# Patient Record
Sex: MALE | Race: WHITE | NOT HISPANIC OR LATINO | Employment: OTHER | URBAN - METROPOLITAN AREA
[De-identification: names, ages, dates, MRNs, and addresses within clinical notes are randomized per-mention and may not be internally consistent; named-entity substitution may affect disease eponyms.]

---

## 2019-01-01 ENCOUNTER — OFFICE VISIT (OUTPATIENT)
Dept: URGENT CARE | Facility: CLINIC | Age: 69
End: 2019-01-01
Payer: MEDICARE

## 2019-01-01 VITALS
HEIGHT: 71 IN | DIASTOLIC BLOOD PRESSURE: 78 MMHG | WEIGHT: 212 LBS | RESPIRATION RATE: 20 BRPM | BODY MASS INDEX: 29.68 KG/M2 | TEMPERATURE: 98.9 F | HEART RATE: 88 BPM | OXYGEN SATURATION: 95 % | SYSTOLIC BLOOD PRESSURE: 120 MMHG

## 2019-01-01 DIAGNOSIS — J01.00 ACUTE MAXILLARY SINUSITIS, RECURRENCE NOT SPECIFIED: ICD-10-CM

## 2019-01-01 DIAGNOSIS — R22.0 FACIAL SWELLING: ICD-10-CM

## 2019-01-01 PROCEDURE — 99204 OFFICE O/P NEW MOD 45 MIN: CPT | Performed by: PHYSICIAN ASSISTANT

## 2019-01-01 RX ORDER — DOXYCYCLINE HYCLATE 100 MG
100 TABLET ORAL 2 TIMES DAILY
Qty: 14 TAB | Refills: 0 | Status: SHIPPED | OUTPATIENT
Start: 2019-01-01 | End: 2019-01-08

## 2019-01-01 RX ORDER — METHYLPREDNISOLONE 4 MG/1
TABLET ORAL
Qty: 21 TAB | Refills: 0 | Status: SHIPPED | OUTPATIENT
Start: 2019-01-01 | End: 2019-07-22

## 2019-01-01 ASSESSMENT — ENCOUNTER SYMPTOMS
FOCAL WEAKNESS: 0
MYALGIAS: 0
TINGLING: 0
SINUS PAIN: 1
WHEEZING: 0
CHILLS: 0
DIARRHEA: 0
SWOLLEN GLANDS: 0
VOMITING: 0
COUGH: 0
SINUS PRESSURE: 1
SORE THROAT: 0
NAUSEA: 0
FEVER: 0
NECK PAIN: 0
SPUTUM PRODUCTION: 0
SENSORY CHANGE: 0
SHORTNESS OF BREATH: 0
HEADACHES: 0
ABDOMINAL PAIN: 0
PALPITATIONS: 0

## 2019-01-01 NOTE — PROGRESS NOTES
Subjective:      Gosia Tejada is a 68 y.o. male who presents with Sinus Problem (Few days sinus pressure , earaches, stuffy nose , postnasal dripping .)            Sinus Problem   This is a new problem. Episode onset: 2-3 days  The problem has been rapidly worsening (rapidly worsening over the past day ) since onset. There has been no fever. Associated symptoms include congestion and sinus pressure. Pertinent negatives include no chills, coughing, ear pain, headaches, neck pain, shortness of breath, sneezing, sore throat or swollen glands. (Facial swelling ) Past treatments include nothing.     Patient denies any new exposure to soaps, lotions, cosmetics.  He has redness and swelling along his face. He denies shortness of breath, throat swelling, or difficulty swallowing.,      Past Medical History:   Diagnosis Date   • Arthritis    • Cholesterol blood decreased    • Heart burn    • Hyperlipemia    • Hypertension    • Pain    • Seasonal allergies        Past Surgical History:   Procedure Laterality Date   • HIP ARTHROPLASTY TOTAL  11/20/2014    Performed by Lucien Fierro M.D. at SURGERY [facility]   • JOINT INJECTION DIAGNOSTIC  2/20/2014    Performed by Lucien Fierro M.D. at SURGERY [facility]   • JOINT INJECTION DIAGNOSTIC  9/12/2013    Performed by Lucien Fierro M.D. at SURGERY [facility]   • OTHER  1954    TONSILLECTOMY   • OTHER      VASECTOMY       No family history on file.    Allergies   Allergen Reactions   • Nkda [No Known Drug Allergy]        Medications, Allergies, and current problem list reviewed today in Epic      Review of Systems   Constitutional: Negative for chills, fever and malaise/fatigue.   HENT: Positive for congestion, sinus pain and sinus pressure. Negative for ear pain, sneezing and sore throat.         Facial swelling    Respiratory: Negative for cough, sputum production, shortness of breath and wheezing.    Cardiovascular: Negative for chest pain,  "palpitations and leg swelling.   Gastrointestinal: Negative for abdominal pain, diarrhea, nausea and vomiting.   Musculoskeletal: Negative for myalgias and neck pain.   Skin: Negative for rash.   Neurological: Negative for tingling, sensory change, focal weakness and headaches.     All other systems reviewed and are negative.        Objective:     /78 (BP Location: Left arm, Patient Position: Sitting, BP Cuff Size: Large adult)   Pulse 88   Temp 37.2 °C (98.9 °F) (Temporal)   Resp 20   Ht 1.803 m (5' 11\")   Wt 96.2 kg (212 lb)   SpO2 95%   BMI 29.57 kg/m²      Physical Exam   Constitutional: He is oriented to person, place, and time. He appears well-developed and well-nourished. No distress.   HENT:   Head: Normocephalic and atraumatic.       Right Ear: Tympanic membrane, external ear and ear canal normal.   Left Ear: Tympanic membrane, external ear and ear canal normal.   Nose: Mucosal edema and rhinorrhea present. Right sinus exhibits maxillary sinus tenderness. Left sinus exhibits maxillary sinus tenderness.   Mouth/Throat: Uvula is midline, oropharynx is clear and moist and mucous membranes are normal. No posterior oropharyngeal edema or posterior oropharyngeal erythema. No tonsillar exudate.   Cardiovascular: Normal rate, regular rhythm and normal heart sounds.  Exam reveals no gallop and no friction rub.    No murmur heard.  Pulmonary/Chest: Effort normal and breath sounds normal. No respiratory distress. He has no wheezes. He has no rales.   Neurological: He is alert and oriented to person, place, and time. No cranial nerve deficit.   Skin: Skin is warm and dry. No rash noted.   Psychiatric: He has a normal mood and affect. His behavior is normal. Judgment and thought content normal.               Assessment/Plan:     1. Acute maxillary sinusitis, recurrence not specified    2. Facial swelling    Will treat for Sinusitis and possible facial cellulitis/inflammation(allergy-related). Patient " demonstrates no signs of anaphylaxis.   Patient is afebrile.     - doxycycline (VIBRAMYCIN) 100 MG Tab; Take 1 Tab by mouth 2 times a day for 7 days.  Dispense: 14 Tab; Refill: 0  - MethylPREDNISolone (MEDROL DOSEPAK) 4 MG Tablet Therapy Pack; Take as directed on package. 1 pack.  Dispense: 21 Tab; Refill: 0    ER if any worsening symptoms.     Differential diagnoses, Supportive care, and indications for immediate follow-up discussed with patient.   Instructed to return to clinic or nearest emergency department for any change in condition, further concerns, or worsening of symptoms.    The patient demonstrated a good understanding and agreed with the treatment plan.    Bri Nagel P.A.-C.

## 2019-06-20 ENCOUNTER — TELEPHONE (OUTPATIENT)
Dept: SCHEDULING | Facility: IMAGING CENTER | Age: 69
End: 2019-06-20

## 2019-07-22 ENCOUNTER — OFFICE VISIT (OUTPATIENT)
Dept: MEDICAL GROUP | Facility: PHYSICIAN GROUP | Age: 69
End: 2019-07-22
Payer: MEDICARE

## 2019-07-22 ENCOUNTER — HOSPITAL ENCOUNTER (OUTPATIENT)
Dept: LAB | Facility: MEDICAL CENTER | Age: 69
End: 2019-07-22
Attending: NURSE PRACTITIONER
Payer: MEDICARE

## 2019-07-22 VITALS
RESPIRATION RATE: 20 BRPM | BODY MASS INDEX: 29.93 KG/M2 | DIASTOLIC BLOOD PRESSURE: 88 MMHG | WEIGHT: 213.8 LBS | SYSTOLIC BLOOD PRESSURE: 142 MMHG | TEMPERATURE: 97.3 F | HEART RATE: 74 BPM | OXYGEN SATURATION: 97 % | HEIGHT: 71 IN

## 2019-07-22 DIAGNOSIS — E78.2 MIXED HYPERLIPIDEMIA: ICD-10-CM

## 2019-07-22 DIAGNOSIS — I10 ESSENTIAL HYPERTENSION: ICD-10-CM

## 2019-07-22 DIAGNOSIS — L71.9 ROSACEA, ACNE: ICD-10-CM

## 2019-07-22 DIAGNOSIS — Z12.5 SCREENING FOR PROSTATE CANCER: ICD-10-CM

## 2019-07-22 DIAGNOSIS — B00.9 HERPES: ICD-10-CM

## 2019-07-22 PROCEDURE — 99214 OFFICE O/P EST MOD 30 MIN: CPT | Performed by: NURSE PRACTITIONER

## 2019-07-22 PROCEDURE — 84153 ASSAY OF PSA TOTAL: CPT | Mod: GA

## 2019-07-22 PROCEDURE — 80061 LIPID PANEL: CPT

## 2019-07-22 PROCEDURE — 36415 COLL VENOUS BLD VENIPUNCTURE: CPT

## 2019-07-22 PROCEDURE — 83036 HEMOGLOBIN GLYCOSYLATED A1C: CPT | Mod: GA

## 2019-07-22 PROCEDURE — 80053 COMPREHEN METABOLIC PANEL: CPT

## 2019-07-22 RX ORDER — ACYCLOVIR 50 MG/G
1 OINTMENT TOPICAL
Qty: 1 TUBE | Refills: 3 | Status: SHIPPED | OUTPATIENT
Start: 2019-07-22 | End: 2022-10-11

## 2019-07-22 RX ORDER — VALACYCLOVIR HYDROCHLORIDE 1 G/1
1000 TABLET, FILM COATED ORAL 2 TIMES DAILY
Qty: 90 TAB | Refills: 0 | Status: SHIPPED | OUTPATIENT
Start: 2019-07-22 | End: 2021-08-10 | Stop reason: SDUPTHER

## 2019-07-22 RX ORDER — METRONIDAZOLE 7.5 MG/G
1 GEL TOPICAL DAILY
Qty: 1 TUBE | Refills: 3 | Status: SHIPPED | OUTPATIENT
Start: 2019-07-22 | End: 2020-11-17 | Stop reason: SDUPTHER

## 2019-07-22 RX ORDER — LISINOPRIL 10 MG/1
10 TABLET ORAL DAILY
Qty: 90 TAB | Refills: 0 | Status: SHIPPED | OUTPATIENT
Start: 2019-07-22 | End: 2019-11-08 | Stop reason: SDUPTHER

## 2019-07-22 RX ORDER — ACYCLOVIR 50 MG/G
1 OINTMENT TOPICAL
COMMUNITY
End: 2019-07-22 | Stop reason: SDUPTHER

## 2019-07-22 RX ORDER — METRONIDAZOLE 7.5 MG/G
1 GEL TOPICAL DAILY
COMMUNITY
End: 2019-07-22 | Stop reason: SDUPTHER

## 2019-07-22 RX ORDER — ATORVASTATIN CALCIUM 10 MG/1
10 TABLET, FILM COATED ORAL DAILY
Qty: 90 TAB | Refills: 1 | Status: SHIPPED | OUTPATIENT
Start: 2019-07-22 | End: 2020-02-22 | Stop reason: SDUPTHER

## 2019-07-22 RX ORDER — VALACYCLOVIR HYDROCHLORIDE 1 G/1
1000 TABLET, FILM COATED ORAL 2 TIMES DAILY
COMMUNITY
End: 2019-07-22 | Stop reason: SDUPTHER

## 2019-07-22 ASSESSMENT — ENCOUNTER SYMPTOMS
HEADACHES: 0
DIZZINESS: 0
ABDOMINAL PAIN: 0
CHILLS: 0
PALPITATIONS: 0
BLURRED VISION: 0
SHORTNESS OF BREATH: 0
FEVER: 0

## 2019-07-22 ASSESSMENT — PATIENT HEALTH QUESTIONNAIRE - PHQ9: CLINICAL INTERPRETATION OF PHQ2 SCORE: 0

## 2019-07-22 NOTE — ASSESSMENT & PLAN NOTE
Chronic in nature. Onset began 19 years ago. He is taking atorvastatin 10mg daily. He denies myalgias.

## 2019-07-22 NOTE — ASSESSMENT & PLAN NOTE
This is a chronic issue. Onset began 19 years ago. He denies CP, SOB, peripheral edema, visual difficulties, or urinary difficulties. He has been taking lisinopril 10mg daily. He admits to limited daily water intake and physical activity due to hip arthritis. He consumes 1-2 sodas daily. He is a nonsmoker. He does eat out at least 6 times per week.

## 2019-07-22 NOTE — PROGRESS NOTES
Gosia Tejada is a 69 y.o. male here to establish care. His previous PCP was Dr. Solano with [place]. He presents with the following concerns:    HPI:      Essential hypertension  This is a chronic issue. Onset began 19 years ago. He denies CP, SOB, peripheral edema, visual difficulties, or urinary difficulties. He has been taking lisinopril 10mg daily. He admits to limited daily water intake and physical activity due to hip arthritis. He consumes 1-2 sodas daily. He is a nonsmoker. He does eat out at least 6 times per week.    Mixed hyperlipidemia  Chronic in nature. Onset began 19 years ago. He is taking atorvastatin 10mg daily. He denies myalgias.    Herpes  Chronic in nature. Frequency of outbreaks every few months. Triggered by heat. Symptoms are managed with Zovirax ointment and valtrex PO with onset of outbreak.    Rosacea, acne  Chronic in nature. His symptoms are managed with Metrogel daily.       Current medicines (including changes today)  Current Outpatient Prescriptions   Medication Sig Dispense Refill   • lisinopril (PRINIVIL) 10 MG Tab Take 1 Tab by mouth every day. 90 Tab 0   • atorvastatin (LIPITOR) 10 MG Tab Take 1 Tab by mouth every day. 90 Tab 1   • valacyclovir (VALTREX) 1 GM Tab Take 1 Tab by mouth 2 times a day. 90 Tab 0   • acyclovir (ZOVIRAX) 5 % Ointment Apply 1 Application to affected area(s) every 3 hours as needed. 1 Tube 3   • metronidazole (METROGEL) 0.75 % gel Apply 1 Application to affected area(s) every day. 1 Tube 3     No current facility-administered medications for this visit.      He  has a past medical history of Arthritis; Heart burn; Hyperlipemia; Hypertension; Rosacea; and Seasonal allergies.  He  has a past surgical history that includes other (1954); other; joint injection diagnostic (9/12/2013); joint injection diagnostic (2/20/2014); and hip arthroplasty total (11/20/2014).  Social History   Substance Use Topics   • Smoking status: Never Smoker   •  "Smokeless tobacco: Never Used   • Alcohol use 1.2 oz/week     2 Cans of beer per week     Social History     Social History Narrative   • No narrative on file     Family History   Problem Relation Age of Onset   • Adopted: Yes     No family status information on file.     Review of Systems   Constitutional: Negative for chills, fever and malaise/fatigue.   HENT: Negative for hearing loss.    Eyes: Negative for blurred vision.   Respiratory: Negative for shortness of breath.    Cardiovascular: Negative for chest pain and palpitations.   Gastrointestinal: Negative for abdominal pain.   Musculoskeletal: Positive for joint pain (R hip).   Neurological: Negative for dizziness and headaches.     All other systems reviewed and are negative    Depression Screening    Little interest or pleasure in doing things?  0 - not at all  Feeling down, depressed , or hopeless? 0 - not at all  Patient Health Questionnaire Score: 0    If depressive symptoms identified deferred to follow up visit unless specifically addressed in assesment and plan.      Interpretation of PHQ-9 Total Score   Score Severity   1-4 Minimal Depression   5-9 Mild Depression   10-14 Moderate Depression   15-19 Moderately Severe Depression   20-27 Severe Depression       Objective:     /88   Pulse 74   Temp 36.3 °C (97.3 °F) (Temporal)   Resp 20   Ht 1.803 m (5' 11\")   Wt 97 kg (213 lb 12.8 oz)   SpO2 97%  Body mass index is 29.82 kg/m².    Physical Exam:  Constitutional: Oriented to person, place, and time and well-developed, well-nourished, and in no distress.   HENT:   Head: Normocephalic and atraumatic.   Right Ear: Tympanic membrane and external ear normal.   Left Ear: Tympanic membrane and external ear normal.   Mouth/Throat: Oropharynx is clear and moist and mucous membranes are normal. No oropharyngeal exudate or posterior oropharyngeal erythema.   Eyes: Conjunctivae and EOM are normal. Pupils are equal, round, and reactive to light.   Neck: " Normal range of motion. Neck supple. No thyromegaly present.   Cardiovascular: Normal rate, regular rhythm, normal heart sounds. Radial pulses intact. Exam reveals no friction rub. No murmur heard.  Pulmonary/Chest: Effort normal and breath sounds normal. No respiratory distress or use of accessory muscles. No wheezes, rhonchi, or rales.   Abdominal: Soft. Bowel sounds are normal. Exhibits no distension and no mass. There is no tenderness. No hepatosplenomegaly.    Musculoskeletal: Full range of motion. No deformity or swelling of joints. DTRs intact.   Neurological: Alert and oriented to person, place, and time. Gait normal.   Skin: Skin is warm and dry. No cyanosis. No edema.  Psychiatric: Mood, memory, affect and judgment normal.     Assessment and Plan:   The following treatment plan was discussed    1. Essential hypertension  His blood pressure is elevated at today's visit.  Continue lisinopril as prescribed.  Encourage lifestyle modifications with diet and regular physical activity.  Advised to monitor blood pressure at home and keep log; demonstrated proper technique for obtaining blood pressure reading.   - Comp Metabolic Panel; Future  - lisinopril (PRINIVIL) 10 MG Tab; Take 1 Tab by mouth every day.  Dispense: 90 Tab; Refill: 0    2. Mixed hyperlipidemia  Labs ordered to reevaluate cholesterol levels.  Continue statin therapy as prescribed.  - Comp Metabolic Panel; Future  - HEMOGLOBIN A1C; Future  - Lipid Profile; Future  - atorvastatin (LIPITOR) 10 MG Tab; Take 1 Tab by mouth every day.  Dispense: 90 Tab; Refill: 1    3. Herpes  Stable, controlled.  Refill medications.  - valacyclovir (VALTREX) 1 GM Tab; Take 1 Tab by mouth 2 times a day.  Dispense: 90 Tab; Refill: 0  - acyclovir (ZOVIRAX) 5 % Ointment; Apply 1 Application to affected area(s) every 3 hours as needed.  Dispense: 1 Tube; Refill: 3    4. Rosacea, acne  Stable, controlled.  Refill medication.  - metronidazole (METROGEL) 0.75 % gel; Apply 1  Application to affected area(s) every day.  Dispense: 1 Tube; Refill: 3    5. Screening for prostate cancer  - PROSTATE SPECIFIC AG SCREENING; Future    Records requested.    Followup: Return in about 1 month (around 8/22/2019) for For follow-up on, HTN.

## 2019-07-22 NOTE — LETTER
[facility]  ZULEYMA Cash  [address]  [place] [address]  Fax: [phone]   Authorization for Release/Disclosure of   Protected Health Information   Name: RAMON STACK : 1950 SSN: [id number]   Address: [address] [place] [id number] Phone:    [phone] (home) [phone] (work)   I authorize the entity listed below to release/disclose the PHI below to:   [facility]/ZULEYMA Cash and ZULEYMA Cash   Provider or Entity Name:  [facility]   [place], [facility] Phone:      Fax:  [phone]   Reason for request: continuity of care   Information to be released:    [  ] LAST COLONOSCOPY,  including any PATH REPORT and follow-up  [  ] LAST FIT/COLOGUARD RESULT [  ] LAST DEXA  [  ] LAST MAMMOGRAM  [  ] LAST PAP  [  ] LAST LABS [  ] RETINA EXAM REPORT  [  ] IMMUNIZATION RECORDS  [X  ] Release all info      [  ] Check here and initial the line next to each item to release ALL health information INCLUDING  _____ Care and treatment for drug and / or alcohol abuse  _____ HIV testing, infection status, or AIDS  _____ Genetic Testing    DATES OF SERVICE OR TIME PERIOD TO BE DISCLOSED: _____________  I understand and acknowledge that:  * This Authorization may be revoked at any time by you in writing, except if your health information has already been used or disclosed.  * Your health information that will be used or disclosed as a result of you signing this authorization could be re-disclosed by the recipient. If this occurs, your re-disclosed health information may no longer be protected by State or Federal laws.  * You may refuse to sign this Authorization. Your refusal will not affect your ability to obtain treatment.  * This Authorization becomes effective upon signing and will  on (date) __________.      If no date is indicated, this Authorization will  one (1) year from the signature date.    Name:  Gosia Tejada    Signature:   Date:     7/22/2019       PLEASE FAX REQUESTED RECORDS BACK TO: [phone]

## 2019-07-22 NOTE — ASSESSMENT & PLAN NOTE
Chronic in nature. Frequency of outbreaks every few months. Triggered by heat. Symptoms are managed with Zovirax ointment and valtrex PO with onset of outbreak.

## 2019-07-23 LAB
ALBUMIN SERPL BCP-MCNC: 4.3 G/DL (ref 3.2–4.9)
ALBUMIN/GLOB SERPL: 1.5 G/DL
ALP SERPL-CCNC: 35 U/L (ref 30–99)
ALT SERPL-CCNC: 21 U/L (ref 2–50)
ANION GAP SERPL CALC-SCNC: 7 MMOL/L (ref 0–11.9)
AST SERPL-CCNC: 23 U/L (ref 12–45)
BILIRUB SERPL-MCNC: 0.8 MG/DL (ref 0.1–1.5)
BUN SERPL-MCNC: 13 MG/DL (ref 8–22)
CALCIUM SERPL-MCNC: 9.1 MG/DL (ref 8.5–10.5)
CHLORIDE SERPL-SCNC: 107 MMOL/L (ref 96–112)
CHOLEST SERPL-MCNC: 176 MG/DL (ref 100–199)
CO2 SERPL-SCNC: 28 MMOL/L (ref 20–33)
CREAT SERPL-MCNC: 1.09 MG/DL (ref 0.5–1.4)
EST. AVERAGE GLUCOSE BLD GHB EST-MCNC: 126 MG/DL
GLOBULIN SER CALC-MCNC: 2.9 G/DL (ref 1.9–3.5)
GLUCOSE SERPL-MCNC: 93 MG/DL (ref 65–99)
HBA1C MFR BLD: 6 % (ref 0–5.6)
HDLC SERPL-MCNC: 42 MG/DL
LDLC SERPL CALC-MCNC: 98 MG/DL
POTASSIUM SERPL-SCNC: 4.3 MMOL/L (ref 3.6–5.5)
PROT SERPL-MCNC: 7.2 G/DL (ref 6–8.2)
PSA SERPL-MCNC: 0.6 NG/ML (ref 0–4)
SODIUM SERPL-SCNC: 142 MMOL/L (ref 135–145)
TRIGL SERPL-MCNC: 178 MG/DL (ref 0–149)

## 2019-07-24 ENCOUNTER — TELEPHONE (OUTPATIENT)
Dept: MEDICAL GROUP | Facility: PHYSICIAN GROUP | Age: 69
End: 2019-07-24

## 2019-07-24 NOTE — TELEPHONE ENCOUNTER
----- Message from ZULEYMA Cash sent at 7/24/2019  9:56 AM PDT -----  I will discuss results at upcoming appointment.    ZULEYMA Cash

## 2019-07-30 ENCOUNTER — OFFICE VISIT (OUTPATIENT)
Dept: MEDICAL GROUP | Facility: PHYSICIAN GROUP | Age: 69
End: 2019-07-30
Payer: MEDICARE

## 2019-07-30 VITALS
WEIGHT: 214 LBS | BODY MASS INDEX: 29.96 KG/M2 | HEIGHT: 71 IN | TEMPERATURE: 97.1 F | HEART RATE: 70 BPM | DIASTOLIC BLOOD PRESSURE: 70 MMHG | SYSTOLIC BLOOD PRESSURE: 126 MMHG | OXYGEN SATURATION: 93 % | RESPIRATION RATE: 18 BRPM

## 2019-07-30 DIAGNOSIS — E78.2 MIXED HYPERLIPIDEMIA: ICD-10-CM

## 2019-07-30 DIAGNOSIS — Z12.11 SCREENING FOR COLORECTAL CANCER: ICD-10-CM

## 2019-07-30 DIAGNOSIS — Z12.12 SCREENING FOR COLORECTAL CANCER: ICD-10-CM

## 2019-07-30 DIAGNOSIS — I10 ESSENTIAL HYPERTENSION: ICD-10-CM

## 2019-07-30 DIAGNOSIS — Z00.00 MEDICARE ANNUAL WELLNESS VISIT, SUBSEQUENT: ICD-10-CM

## 2019-07-30 DIAGNOSIS — M16.12 OSTEOARTHRITIS OF LEFT HIP, UNSPECIFIED OSTEOARTHRITIS TYPE: ICD-10-CM

## 2019-07-30 DIAGNOSIS — L71.9 ROSACEA, ACNE: ICD-10-CM

## 2019-07-30 DIAGNOSIS — B00.9 HERPES: ICD-10-CM

## 2019-07-30 DIAGNOSIS — R73.03 PREDIABETES: ICD-10-CM

## 2019-07-30 PROBLEM — M19.90 OA (OSTEOARTHRITIS): Status: ACTIVE | Noted: 2019-07-30

## 2019-07-30 PROCEDURE — G0439 PPPS, SUBSEQ VISIT: HCPCS | Performed by: NURSE PRACTITIONER

## 2019-07-30 ASSESSMENT — ENCOUNTER SYMPTOMS: GENERAL WELL-BEING: GOOD

## 2019-07-30 ASSESSMENT — PATIENT HEALTH QUESTIONNAIRE - PHQ9: CLINICAL INTERPRETATION OF PHQ2 SCORE: 0

## 2019-07-30 ASSESSMENT — ACTIVITIES OF DAILY LIVING (ADL): BATHING_REQUIRES_ASSISTANCE: 0

## 2019-07-30 NOTE — PROGRESS NOTES
Chief Complaint   Patient presents with   • Annual Wellness Visit         HPI:  Gosia is a 69 y.o. here for Medicare Annual Wellness Visit    Patient Active Problem List    Diagnosis Date Noted   • Prediabetes 07/30/2019   • Essential hypertension 07/22/2019   • Mixed hyperlipidemia 07/22/2019   • Herpes 07/22/2019   • Rosacea, acne 07/22/2019   • Osteoarthrosis, unspecified whether generalized or localized, pelvic region and thigh 09/12/2013       Current Outpatient Prescriptions   Medication Sig Dispense Refill   • lisinopril (PRINIVIL) 10 MG Tab Take 1 Tab by mouth every day. 90 Tab 0   • atorvastatin (LIPITOR) 10 MG Tab Take 1 Tab by mouth every day. 90 Tab 1   • valacyclovir (VALTREX) 1 GM Tab Take 1 Tab by mouth 2 times a day. 90 Tab 0   • acyclovir (ZOVIRAX) 5 % Ointment Apply 1 Application to affected area(s) every 3 hours as needed. 1 Tube 3   • metronidazole (METROGEL) 0.75 % gel Apply 1 Application to affected area(s) every day. 1 Tube 3     No current facility-administered medications for this visit.         Patient is taking medications as noted in medication list.  Current supplements as per medication list.     Allergies: Nkda [no known drug allergy]    Current social contact/activities:  Motorcycle riding    Is patient current with immunizations? No, due for TDAP. Patient is interested in receiving NONE today.    He  reports that he has never smoked. He has never used smokeless tobacco. He reports that he drinks about 1.2 oz of alcohol per week . He reports that he does not use drugs.  Counseling given: Not Answered        DPA/Advanced directive: Patient has Advanced Directive on file.     ROS:    Gait: Uses no assistive device   Ostomy: No   Other tubes: No   Amputations: No   Chronic oxygen use No   Last eye exam 06/2019  Wears hearing aids: No  : Denies any urinary leakage during the last 6 months      Screening:      Depression Screening    Little interest or pleasure in doing things?  0 - not  at all  Feeling down, depressed, or hopeless? 0 - not at all  Trouble falling or staying asleep, or sleeping too much?     Feeling tired or having little energy?     Poor appetite or overeating?     Feeling bad about yourself - or that you are a failure or have let yourself or your family down?    Trouble concentrating on things, such as reading the newspaper or watching television?    Moving or speaking so slowly that other people could have noticed.  Or the opposite - being so fidgety or restless that you have been moving around a lot more than usual?     Thoughts that you would be better off dead, or of hurting yourself?     Patient Health Questionnaire Score:        If depressive symptoms identified deferred to follow up visit unless specifically addressed in assessment and plan.    Interpretation of PHQ-9 Total Score   Score Severity   1-4 No Depression   5-9 Mild Depression   10-14 Moderate Depression   15-19 Moderately Severe Depression   20-27 Severe Depression      Screening for Cognitive Impairment    Three Minute Recall (village, kitchen, baby)  3/3    Draw clock face with all 12 numbers and set the hands to show 10 past 10.  Yes    If cognitive concerns identified, deferred for follow up unless specifically addressed in assessment and plan.    Fall Risk Assessment    Has the patient had two or more falls in the last year or any fall with injury in the last year?  No  If fall risk identified, deferred for follow up unless specifically addressed in assessment and plan.      Safety Assessment    Throw rugs on floor.  Yes  Handrails on all stairs.  Yes  Good lighting in all hallways.  Yes  Difficulty hearing.  No  Patient counseled about all safety risks that were identified.    Functional Assessment ADLs    Are there any barriers preventing you from cooking for yourself or meeting nutritional needs?  No.    Are there any barriers preventing you from driving safely or obtaining transportation?  No.    Are  "there any barriers preventing you from using a telephone or calling for help?  No.    Are there any barriers preventing you from shopping?  No.    Are there any barriers preventing you from taking care of your own finances?  No.    Are there any barriers preventing you from managing your medications?    No.    Are there any barriers preventing you from showering, bathing or dressing yourself?  No.    Are you currently engaging in any exercise or physical activity?  No.     What is your perception of your health?  Good.    Health Maintenance Summary                HEPATITIS C SCREENING Overdue 1950     IMM DTaP/Tdap/Td Vaccine Overdue 2/23/1968     COLONOSCOPY Overdue 2/23/1999     IMM ZOSTER VACCINES Overdue 2/23/1999     IMM INFLUENZA Next Due 9/1/2019      Done 10/26/2018 Imm Admin: Influenza Vaccine Adult HD     Patient has more history with this topic...          Patient Care Team:  ZULEYMA Cash as PCP - General (Family Medicine)      Social History   Substance Use Topics   • Smoking status: Never Smoker   • Smokeless tobacco: Never Used   • Alcohol use 1.2 oz/week     2 Cans of beer per week     Family History   Problem Relation Age of Onset   • Adopted: Yes     He  has a past medical history of Arthritis; Heart burn; Hyperlipemia; Hypertension; Rosacea; and Seasonal allergies.   Past Surgical History:   Procedure Laterality Date   • HIP ARTHROPLASTY TOTAL  11/20/2014    Left; Performed by Lucien Fierro M.D. at SURGERY [facility]   • JOINT INJECTION DIAGNOSTIC  2/20/2014    Performed by Lucien Fierro M.D. at SUR[facility]ERY [facility]   • JOINT INJECTION DIAGNOSTIC  9/12/2013    Performed by Lucien Fierro M.D. at SUR[facility]ERY [facility]   • OTHER  1954    TONSILLECTOMY   • OTHER      VASECTOMY         Exam:     /70   Pulse 70   Temp 36.2 °C (97.1 °F) (Temporal)   Resp 18   Ht 1.803 m (5' 11\")   Wt 97.1 kg (214 lb)   SpO2 93%  Body mass index is 29.85 kg/m².    Hearing " fair.    Dentition good  Alert, oriented in no acute distress.  Eye contact is good, speech goal directed, affect calm    Assessment and Plan. The following treatment and monitoring plan is recommended:    1. Medicare annual wellness visit, subsequent     2. Screening for colorectal cancer     3. Prediabetes     4. Mixed hyperlipidemia         Services suggested: No services needed at this time  Health Care Screening recommendations as per orders if indicated.  Referrals offered: PT/OT/Nutrition counseling/Behavioral Health/Smoking cessation as per orders if indicated.    Discussion today about general wellness and lifestyle habits:    · Prevent falls and reduce trip hazards; Cautioned about securing or removing rugs.  · Have a working fire alarm and carbon monoxide detector;   · Engage in regular physical activity and social activities       Follow-up: No Follow-up on file.

## 2019-07-30 NOTE — PROGRESS NOTES
Chief Complaint   Patient presents with   • Annual Wellness Visit       HPI:  Gosia is a 69 y.o. here for Medicare Annual Wellness Visit    Patient Active Problem List    Diagnosis Date Noted   • Prediabetes 07/30/2019   • OA (osteoarthritis) 07/30/2019   • Essential hypertension 07/22/2019   • Mixed hyperlipidemia 07/22/2019   • Herpes 07/22/2019   • Rosacea, acne 07/22/2019       Current Outpatient Prescriptions   Medication Sig Dispense Refill   • lisinopril (PRINIVIL) 10 MG Tab Take 1 Tab by mouth every day. 90 Tab 0   • atorvastatin (LIPITOR) 10 MG Tab Take 1 Tab by mouth every day. 90 Tab 1   • valacyclovir (VALTREX) 1 GM Tab Take 1 Tab by mouth 2 times a day. 90 Tab 0   • acyclovir (ZOVIRAX) 5 % Ointment Apply 1 Application to affected area(s) every 3 hours as needed. 1 Tube 3   • metronidazole (METROGEL) 0.75 % gel Apply 1 Application to affected area(s) every day. 1 Tube 3     No current facility-administered medications for this visit.         Patient is taking medications as noted in medication list.  Current supplements as per medication list.     Allergies: Nkda [no known drug allergy]    Current social contact/activities:  Motorcycle riding    Is patient current with immunizations? No, due for TDAP. Patient is interested in receiving NONE today.    He  reports that he has never smoked. He has never used smokeless tobacco. He reports that he drinks about 1.2 oz of alcohol per week . He reports that he does not use drugs.  Counseling given: Not Answered        DPA/Advanced directive: Patient has Advanced Directive on file.     ROS:    Gait: Uses no assistive device   Ostomy: No   Other tubes: No   Amputations: No   Chronic oxygen use No   Last eye exam 06/2019  Wears hearing aids: No  : Denies any urinary leakage during the last 6 months      Screening:      Depression Screening    Little interest or pleasure in doing things?  0 - not at all  Feeling down, depressed, or hopeless? 0 - not at all  Trouble  falling or staying asleep, or sleeping too much?     Feeling tired or having little energy?     Poor appetite or overeating?     Feeling bad about yourself - or that you are a failure or have let yourself or your family down?    Trouble concentrating on things, such as reading the newspaper or watching television?    Moving or speaking so slowly that other people could have noticed.  Or the opposite - being so fidgety or restless that you have been moving around a lot more than usual?     Thoughts that you would be better off dead, or of hurting yourself?     Patient Health Questionnaire Score:        If depressive symptoms identified deferred to follow up visit unless specifically addressed in assessment and plan.    Interpretation of PHQ-9 Total Score   Score Severity   1-4 No Depression   5-9 Mild Depression   10-14 Moderate Depression   15-19 Moderately Severe Depression   20-27 Severe Depression      Screening for Cognitive Impairment    Three Minute Recall (village, kitchen, baby)  3/3    Draw clock face with all 12 numbers and set the hands to show 10 past 10.  Yes    If cognitive concerns identified, deferred for follow up unless specifically addressed in assessment and plan.    Fall Risk Assessment    Has the patient had two or more falls in the last year or any fall with injury in the last year?  No  If fall risk identified, deferred for follow up unless specifically addressed in assessment and plan.      Safety Assessment    Throw rugs on floor.  Yes  Handrails on all stairs.  Yes  Good lighting in all hallways.  Yes  Difficulty hearing.  No  Patient counseled about all safety risks that were identified.    Functional Assessment ADLs    Are there any barriers preventing you from cooking for yourself or meeting nutritional needs?  No.    Are there any barriers preventing you from driving safely or obtaining transportation?  No.    Are there any barriers preventing you from using a telephone or calling for  "help?  No.    Are there any barriers preventing you from shopping?  No.    Are there any barriers preventing you from taking care of your own finances?  No.    Are there any barriers preventing you from managing your medications?    No.    Are there any barriers preventing you from showering, bathing or dressing yourself?  No.    Are you currently engaging in any exercise or physical activity?  No.     What is your perception of your health?  Good.    Health Maintenance Summary                HEPATITIS C SCREENING Overdue 1950     IMM DTaP/Tdap/Td Vaccine Overdue 2/23/1968     COLONOSCOPY Overdue 2/23/1999     IMM ZOSTER VACCINES Overdue 2/23/1999     IMM INFLUENZA Next Due 9/1/2019      Done 10/26/2018 Imm Admin: Influenza Vaccine Adult HD     Patient has more history with this topic...          Patient Care Team:  ZULEYMA Cash as PCP - General (Family Medicine)      Social History   Substance Use Topics   • Smoking status: Never Smoker   • Smokeless tobacco: Never Used   • Alcohol use 1.2 oz/week     2 Cans of beer per week     Family History   Problem Relation Age of Onset   • Adopted: Yes     He  has a past medical history of Arthritis; Heart burn; Hyperlipemia; Hypertension; Rosacea; and Seasonal allergies.   Past Surgical History:   Procedure Laterality Date   • HIP ARTHROPLASTY TOTAL  11/20/2014    Left; Performed by Lucien Fierro M.D. at SUR[facility]ERY [facility]   • JOINT INJECTION DIAGNOSTIC  2/20/2014    Performed by Lucien Fierro M.D. at SURGERY [facility]   • JOINT INJECTION DIAGNOSTIC  9/12/2013    Performed by Lucien Fierro M.D. at SURGERY [facility]   • OTHER  1954    TONSILLECTOMY   • OTHER      VASECTOMY       Exam:     /70   Pulse 70   Temp 36.2 °C (97.1 °F) (Temporal)   Resp 18   Ht 1.803 m (5' 11\")   Wt 97.1 kg (214 lb)   SpO2 93%  Body mass index is 29.85 kg/m².    Hearing fair.    Dentition good  Alert, oriented in no acute distress.  Eye contact is " good, speech goal directed, affect calm    Assessment and Plan. The following treatment and monitoring plan is recommended:    1. Medicare annual wellness visit, subsequent     2. Screening for colorectal cancer     3. Prediabetes  Recommend lifestyle modifications with diet and regular physical activity.   4. Mixed hyperlipidemia  Stable. Continue statin therapy as prescribed and continue working on lifestyle modifications.   5. Osteoarthritis of left hip, unspecified osteoarthritis type  Stable, controlled.   6. Rosacea, acne  Stable, controlled.   7. Essential hypertension  Stable, controlled.   8. Herpes  Stable, controlled.       Services suggested: No services needed at this time  Health Care Screening recommendations as per orders if indicated.  Referrals offered: PT/OT/Nutrition counseling/Behavioral Health/Smoking cessation as per orders if indicated.    Discussion today about general wellness and lifestyle habits:    · Prevent falls and reduce trip hazards; Cautioned about securing or removing rugs.  · Have a working fire alarm and carbon monoxide detector;   · Engage in regular physical activity and social activities     Follow-up: Return in about 1 month (around 8/30/2019), or if symptoms worsen or fail to improve, for HTN.

## 2019-08-20 ENCOUNTER — OFFICE VISIT (OUTPATIENT)
Dept: MEDICAL GROUP | Facility: PHYSICIAN GROUP | Age: 69
End: 2019-08-20
Payer: MEDICARE

## 2019-08-20 VITALS
SYSTOLIC BLOOD PRESSURE: 122 MMHG | RESPIRATION RATE: 18 BRPM | OXYGEN SATURATION: 93 % | DIASTOLIC BLOOD PRESSURE: 68 MMHG | WEIGHT: 212.2 LBS | TEMPERATURE: 97 F | BODY MASS INDEX: 29.71 KG/M2 | HEIGHT: 71 IN | HEART RATE: 65 BPM

## 2019-08-20 DIAGNOSIS — I10 ESSENTIAL HYPERTENSION: ICD-10-CM

## 2019-08-20 DIAGNOSIS — Z01.818 PREOPERATIVE CLEARANCE: ICD-10-CM

## 2019-08-20 PROCEDURE — 99214 OFFICE O/P EST MOD 30 MIN: CPT | Performed by: NURSE PRACTITIONER

## 2019-08-20 ASSESSMENT — ENCOUNTER SYMPTOMS
FEVER: 0
SHORTNESS OF BREATH: 0
CHILLS: 0
PALPITATIONS: 0
HEADACHES: 0
DIZZINESS: 0

## 2019-08-20 NOTE — PROGRESS NOTES
Subjective:   Gosia Tejada is a 69 y.o. male here today for follow up on HTN.    Preoperative clearance  He is planning to have R hip replacement with [facility] Dr Luis Dupont sometime in Nov. He states that his surgery will not be scheduled until he is medically cleared by his PCP.     Essential hypertension  This is a chronic issue. His BP is currently managed with lisinopril 10mg daily. His BP today is 122/68. He denies CP, SOB, dizziness, or headaches. He has increased daily water intake over the past month. His physical activity is limited due to hip arthritis.      Current medicines (including changes today)  Current Outpatient Medications   Medication Sig Dispense Refill   • lisinopril (PRINIVIL) 10 MG Tab Take 1 Tab by mouth every day. 90 Tab 0   • atorvastatin (LIPITOR) 10 MG Tab Take 1 Tab by mouth every day. 90 Tab 1   • valacyclovir (VALTREX) 1 GM Tab Take 1 Tab by mouth 2 times a day. 90 Tab 0   • acyclovir (ZOVIRAX) 5 % Ointment Apply 1 Application to affected area(s) every 3 hours as needed. 1 Tube 3   • metronidazole (METROGEL) 0.75 % gel Apply 1 Application to affected area(s) every day. 1 Tube 3     No current facility-administered medications for this visit.      He  has a past medical history of Arthritis, Heart burn, Hyperlipemia, Hypertension, Rosacea, and Seasonal allergies.    Social History     Socioeconomic History   • Marital status:      Spouse name: Not on file   • Number of children: Not on file   • Years of education: Not on file   • Highest education level: Not on file   Occupational History     Comment: Psychologist    Social Needs   • Financial resource strain: Not on file   • Food insecurity:     Worry: Not on file     Inability: Not on file   • Transportation needs:     Medical: Not on file     Non-medical: Not on file   Tobacco Use   • Smoking status: Never Smoker   • Smokeless tobacco: Never Used   Substance and Sexual Activity   • Alcohol use: Yes      "Alcohol/week: 1.2 oz     Types: 2 Cans of beer per week   • Drug use: No   • Sexual activity: Not on file     Comment: ; 31 years    Lifestyle   • Physical activity:     Days per week: Not on file     Minutes per session: Not on file   • Stress: Not on file   Relationships   • Social connections:     Talks on phone: Not on file     Gets together: Not on file     Attends [employer] service: Not on file     Active member of club or organization: Not on file     Attends meetings of clubs or organizations: Not on file     Relationship status: Not on file   • Intimate partner violence:     Fear of current or ex partner: Not on file     Emotionally abused: Not on file     Physically abused: Not on file     Forced sexual activity: Not on file   Other Topics Concern   • Not on file   Social History Narrative   • Not on file       Review of Systems   Constitutional: Negative for chills and fever.   Respiratory: Negative for shortness of breath.    Cardiovascular: Negative for chest pain, palpitations and leg swelling.   Musculoskeletal: Positive for joint pain.   Neurological: Negative for dizziness and headaches.        Objective:     /68   Pulse 65   Temp 36.1 °C (97 °F) (Temporal)   Resp 18   Ht 1.803 m (5' 11\")   Wt 96.3 kg (212 lb 3.2 oz)   SpO2 93%  Body mass index is 29.6 kg/m².     Physical Exam:  Constitutional: Oriented to person, place, and time and well-developed, well-nourished, and in no distress.   HENT:   Head: Normocephalic and atraumatic.   Eyes: Conjunctivae and EOM are normal. Pupils are equal, round, and reactive to light.   Neck: Normal range of motion. Neck supple.  Cardiovascular: Normal rate, regular rhythm, normal heart sounds. Radial pulses intact. Exam reveals no friction rub. No murmur heard.  Pulmonary/Chest: Effort normal and breath sounds normal. No respiratory distress or use of accessory muscles. No wheezes, rhonchi, or rales.   Abdominal: Soft. Bowel sounds are normal. " Exhibits no distension and no mass. There is no tenderness. No hepatosplenomegaly.    Musculoskeletal: Full range of motion. No deformity or swelling of joints.   Neurological: Alert and oriented to person, place, and time.    Skin: Skin is warm and dry. No cyanosis. No edema.  Psychiatric: Mood, memory, affect and judgment normal.       Assessment and Plan:   The following treatment plan was discussed    1. Essential hypertension  Stable, improved. Continue lisinopril as prescribed. Continue working on lifestyle modifications with diet and physical activity. Continue monitoring BP at home and keep log.  - DX-CHEST-2 VIEWS; Future    2. Preoperative clearance  Advised to have labs and chest xray drawn one week prior to scheduled preop clearance.  - DX-CHEST-2 VIEWS; Future  - CBC WITH DIFFERENTIAL; Future      Followup: Return in about 1 month (around 9/20/2019), or if symptoms worsen or fail to improve.

## 2019-08-20 NOTE — ASSESSMENT & PLAN NOTE
He is planning to have R hip replacement with [facility] Dr Luis Dupont sometime in Nov. He states that his surgery will not be scheduled until he is medically cleared by his PCP.

## 2019-08-20 NOTE — ASSESSMENT & PLAN NOTE
This is a chronic issue. His BP is currently managed with lisinopril 10mg daily. His BP today is 122/68. He denies CP, SOB, dizziness, or headaches. He has increased daily water intake over the past month. His physical activity is limited due to hip arthritis.

## 2019-08-27 ENCOUNTER — HOSPITAL ENCOUNTER (OUTPATIENT)
Dept: LAB | Facility: MEDICAL CENTER | Age: 69
End: 2019-08-27
Attending: NURSE PRACTITIONER
Payer: MEDICARE

## 2019-08-27 DIAGNOSIS — Z01.818 PREOPERATIVE CLEARANCE: ICD-10-CM

## 2019-08-27 LAB
BASOPHILS # BLD AUTO: 1.6 % (ref 0–1.8)
BASOPHILS # BLD: 0.06 K/UL (ref 0–0.12)
EOSINOPHIL # BLD AUTO: 0.11 K/UL (ref 0–0.51)
EOSINOPHIL NFR BLD: 2.9 % (ref 0–6.9)
ERYTHROCYTE [DISTWIDTH] IN BLOOD BY AUTOMATED COUNT: 43.4 FL (ref 35.9–50)
HCT VFR BLD AUTO: 45.8 % (ref 42–52)
HGB BLD-MCNC: 14.2 G/DL (ref 14–18)
IMM GRANULOCYTES # BLD AUTO: 0 K/UL (ref 0–0.11)
IMM GRANULOCYTES NFR BLD AUTO: 0 % (ref 0–0.9)
LYMPHOCYTES # BLD AUTO: 1.07 K/UL (ref 1–4.8)
LYMPHOCYTES NFR BLD: 28.2 % (ref 22–41)
MCH RBC QN AUTO: 27.8 PG (ref 27–33)
MCHC RBC AUTO-ENTMCNC: 31 G/DL (ref 33.7–35.3)
MCV RBC AUTO: 89.8 FL (ref 81.4–97.8)
MONOCYTES # BLD AUTO: 0.38 K/UL (ref 0–0.85)
MONOCYTES NFR BLD AUTO: 10 % (ref 0–13.4)
NEUTROPHILS # BLD AUTO: 2.17 K/UL (ref 1.82–7.42)
NEUTROPHILS NFR BLD: 57.3 % (ref 44–72)
NRBC # BLD AUTO: 0 K/UL
NRBC BLD-RTO: 0 /100 WBC
PLATELET # BLD AUTO: 224 K/UL (ref 164–446)
PMV BLD AUTO: 10.4 FL (ref 9–12.9)
RBC # BLD AUTO: 5.1 M/UL (ref 4.7–6.1)
WBC # BLD AUTO: 3.8 K/UL (ref 4.8–10.8)

## 2019-08-27 PROCEDURE — 36415 COLL VENOUS BLD VENIPUNCTURE: CPT

## 2019-08-27 PROCEDURE — 85025 COMPLETE CBC W/AUTO DIFF WBC: CPT

## 2019-09-04 ENCOUNTER — TELEPHONE (OUTPATIENT)
Dept: MEDICAL GROUP | Facility: PHYSICIAN GROUP | Age: 69
End: 2019-09-04

## 2019-09-04 ENCOUNTER — OFFICE VISIT (OUTPATIENT)
Dept: MEDICAL GROUP | Facility: PHYSICIAN GROUP | Age: 69
End: 2019-09-04
Payer: MEDICARE

## 2019-09-04 VITALS
DIASTOLIC BLOOD PRESSURE: 90 MMHG | HEIGHT: 71 IN | OXYGEN SATURATION: 96 % | HEART RATE: 84 BPM | RESPIRATION RATE: 18 BRPM | TEMPERATURE: 97.8 F | WEIGHT: 211.8 LBS | BODY MASS INDEX: 29.65 KG/M2 | SYSTOLIC BLOOD PRESSURE: 132 MMHG

## 2019-09-04 DIAGNOSIS — Z01.818 PREOPERATIVE CLEARANCE: ICD-10-CM

## 2019-09-04 PROCEDURE — 99214 OFFICE O/P EST MOD 30 MIN: CPT | Performed by: NURSE PRACTITIONER

## 2019-09-04 PROCEDURE — 93000 ELECTROCARDIOGRAM COMPLETE: CPT | Performed by: NURSE PRACTITIONER

## 2019-09-04 NOTE — LETTER
September 4, 2019         Patient: Gosia Tejada   YOB: 1950   Date of Visit: 9/4/2019           To Whom it May Concern:    I am Mr. Gosia Tejada's primary care provider. He is medically cleared to have anterior right total hip arthoplasty.     If you have any questions or concerns, please don't hesitate to call.        Sincerely,           BUNNY Cash.  Electronically Signed

## 2019-09-04 NOTE — PROGRESS NOTES
REASON FOR VISIT: Pre-Op Consultation  Consultation Requested by: Dr. [facility]  Procedure date and type: Pending surgery date; Anterior right total hip arthroplasty  History of condition for which surgery is planned: Chronic arthritic pain of R hip.    Current chronic conditions: has Essential hypertension; Mixed hyperlipidemia; Herpes; Rosacea, acne; Prediabetes; OA (osteoarthritis); and Preoperative clearance on their problem list.  Past medical history:  has a past medical history of Arthritis, Heart burn, Hyperlipemia, Hypertension, Rosacea, and Seasonal allergies.. Negative for: CAD, SBE, CVA, TIA, DVT, PE, bleeding requiring transfusion, intubation.  Surgical and anesthetic history:  has a past surgical history that includes other (1954); other; joint injection diagnostic (9/12/2013); joint injection diagnostic (2/20/2014); and hip arthroplasty total (11/20/2014). Prior surgery without complication, bleeding, reaction to anesthetic, prolonged recovery  Habits:   Social History     Tobacco Use   • Smoking status: Never Smoker   • Smokeless tobacco: Never Used   Substance Use Topics   • Alcohol use: Yes     Alcohol/week: 1.2 oz     Types: 2 Cans of beer per week   • Drug use: No     Allergies: Nkda [no known drug allergy] No known allergy to Anesthetic, or Latex.     Current medicines:   Current Outpatient Medications   Medication Sig Dispense Refill   • lisinopril (PRINIVIL) 10 MG Tab Take 1 Tab by mouth every day. 90 Tab 0   • atorvastatin (LIPITOR) 10 MG Tab Take 1 Tab by mouth every day. 90 Tab 1   • valacyclovir (VALTREX) 1 GM Tab Take 1 Tab by mouth 2 times a day. 90 Tab 0   • acyclovir (ZOVIRAX) 5 % Ointment Apply 1 Application to affected area(s) every 3 hours as needed. 1 Tube 3   • metronidazole (METROGEL) 0.75 % gel Apply 1 Application to affected area(s) every day. 1 Tube 3     No current facility-administered medications for this visit.      Anticoagulant: ASA, NSAIDs  "  Herbals: Black Cohash, Echinacea, Garlic, [place], Ginkgo Biloba, Ginseng, Kava, Ralph’s Wort,  Saw Palmetto, Valerian             ROS: negative for: CP, SOB, SHEEHAN, Orthopnea, wheezing, leg edema, polydipsia, polyuria, fevers, chills, sweats, cough, cold, congestion, abd pain, reflux, black or bloody stools, weight loss/gain.  Functional Status: ambulatory, uses assistive device, prolonged immobility, lives alone, requires PT/FT care    PHYSICAL EXAMINATION:  VITAL SIGNS: /90   Pulse 84   Temp 36.6 °C (97.8 °F) (Temporal)   Resp 18   Ht 1.803 m (5' 11\")   Wt 96.1 kg (211 lb 12.8 oz)   SpO2 96%  Body mass index is 29.54 kg/m².  HEENT: EOMI, PERRL. Oropharynx pink, moist. Mallampati: I (soft palate, uvula, fauces). Neck supple, no cervical lymphadenopathy.   LUNGS: CTAB good excursion.   HEART: RRR no murmur.  ABDOMEN: soft, nondistended, nontender normal BS. No HSM.  LOWER EXTREMITIES: warm and well perfused with no edema.    Labs: See attached.    EKG Interpretation   Interpreted by me   Rhythm: normal sinus   Rate: normal   Axis: normal   Ectopy: none   Conduction: normal   ST Segments: no acute change   T Waves: no acute change   Q Waves: none   Clinical Impression: no acute changes and normal EKG    IMPRESSION:  The encounter diagnosis was Preoperative clearance.  1. Planned surgery: Anterior right total hip arthroplasty  2. High risk medical conditions: negative for Cardiac, Pulmonary, Bleeding, Poor healing, Thrombosis, Debility    PLAN:  1. Chronic medical conditions: Stable and controlled. Continue current medicines.   2. Avoid drugs that potentiate bleeding as advised by surgeon  3. Discontinue all herbal supplements 2 weeks prior to surgery.  4. Need for SBE prophylaxis: No  5. This patient is considered Low risk for cardiopulmonary complications for this planned surgery.    Wicho Index for assessing perioperative cardiovascular risk [Circulation 1999;100:1047]:   (one point each for * high-risk " surgery [ intrathoracic, suprainguinal vascular, intraperitoneal ],* Hx ischemic heart dz, * Hx CHF, *Hx TIA or CVA, *IDDM, *Serum Cr >2.0)   Interpretation of risk: (Complication = MI, Pulm edema, v-fib or cardiac arrest, complete heart block)  Very Low: 0 points = 0.4 % complication. Low: 1 point = 0.9 %, Moderate: 2 points = 6.6 %, High: 3+ points = 11%.

## 2019-09-04 NOTE — TELEPHONE ENCOUNTER
----- Message from ZULEYMA Cash sent at 9/4/2019  9:13 AM PDT -----  Please fax letter, progress note, EKG, labs, and chest xray results to [facility] office.    ZULEYMA Cash

## 2019-09-17 ENCOUNTER — HOSPITAL ENCOUNTER (OUTPATIENT)
Facility: MEDICAL CENTER | Age: 69
End: 2019-09-17
Attending: ORTHOPAEDIC SURGERY | Admitting: ORTHOPAEDIC SURGERY
Payer: MEDICARE

## 2019-09-30 ENCOUNTER — IMMUNIZATION (OUTPATIENT)
Dept: SOCIAL WORK | Facility: CLINIC | Age: 69
End: 2019-09-30
Payer: MEDICARE

## 2019-09-30 DIAGNOSIS — Z23 NEED FOR VACCINATION: ICD-10-CM

## 2019-09-30 PROCEDURE — G0008 ADMIN INFLUENZA VIRUS VAC: HCPCS | Performed by: REGISTERED NURSE

## 2019-09-30 PROCEDURE — 90662 IIV NO PRSV INCREASED AG IM: CPT | Performed by: REGISTERED NURSE

## 2019-11-19 ENCOUNTER — APPOINTMENT (OUTPATIENT)
Dept: ADMISSIONS | Facility: MEDICAL CENTER | Age: 69
End: 2019-11-19
Payer: MEDICARE

## 2020-06-23 ENCOUNTER — OFFICE VISIT (OUTPATIENT)
Dept: MEDICAL GROUP | Facility: PHYSICIAN GROUP | Age: 70
End: 2020-06-23
Payer: MEDICARE

## 2020-06-23 VITALS
HEART RATE: 60 BPM | HEIGHT: 71 IN | DIASTOLIC BLOOD PRESSURE: 80 MMHG | OXYGEN SATURATION: 96 % | TEMPERATURE: 99.1 F | WEIGHT: 216 LBS | RESPIRATION RATE: 16 BRPM | BODY MASS INDEX: 30.24 KG/M2 | SYSTOLIC BLOOD PRESSURE: 124 MMHG

## 2020-06-23 DIAGNOSIS — R73.09 ELEVATED HEMOGLOBIN A1C: ICD-10-CM

## 2020-06-23 DIAGNOSIS — I10 ESSENTIAL HYPERTENSION: ICD-10-CM

## 2020-06-23 DIAGNOSIS — Z11.59 NEED FOR HEPATITIS C SCREENING TEST: ICD-10-CM

## 2020-06-23 DIAGNOSIS — Z23 NEED FOR VACCINATION: ICD-10-CM

## 2020-06-23 DIAGNOSIS — E78.2 MIXED HYPERLIPIDEMIA: ICD-10-CM

## 2020-06-23 PROCEDURE — 90471 IMMUNIZATION ADMIN: CPT | Performed by: NURSE PRACTITIONER

## 2020-06-23 PROCEDURE — 99214 OFFICE O/P EST MOD 30 MIN: CPT | Mod: 25 | Performed by: NURSE PRACTITIONER

## 2020-06-23 PROCEDURE — 90715 TDAP VACCINE 7 YRS/> IM: CPT | Performed by: NURSE PRACTITIONER

## 2020-06-23 RX ORDER — LISINOPRIL 10 MG/1
10 TABLET ORAL DAILY
Qty: 90 TAB | Refills: 0 | Status: SHIPPED | OUTPATIENT
Start: 2020-06-23 | End: 2020-09-30 | Stop reason: SDUPTHER

## 2020-06-23 RX ORDER — ATORVASTATIN CALCIUM 10 MG/1
10 TABLET, FILM COATED ORAL DAILY
Qty: 90 TAB | Refills: 0 | Status: SHIPPED | OUTPATIENT
Start: 2020-06-23 | End: 2020-09-30 | Stop reason: SDUPTHER

## 2020-06-23 SDOH — HEALTH STABILITY: MENTAL HEALTH: HOW OFTEN DO YOU HAVE A DRINK CONTAINING ALCOHOL?: 2-4 TIMES A MONTH

## 2020-06-23 ASSESSMENT — FIBROSIS 4 INDEX: FIB4 SCORE: 1.59

## 2020-06-23 ASSESSMENT — PATIENT HEALTH QUESTIONNAIRE - PHQ9: CLINICAL INTERPRETATION OF PHQ2 SCORE: 0

## 2020-06-23 NOTE — ASSESSMENT & PLAN NOTE
New to me.  This is a chronic issue.  Originally diagnosed 20 years ago.  Currently taking atorvastatin 10 mg, tolerating well.  He needs refills today.  He denies CP, palpitations, dizziness, headaches, lower extremity swelling.  Last lipid panel was last year with elevated triglycerides.  His last A1c last year was 6, his BP is well controlled.  No family history of lumbar stroke as patient was adopted.  
To me.  Chronic problem.  BP today is WNL.  Patient was originally diagnosed with HTN 20 years ago, taking lisinopril 10 mg daily.  He needs refills today.  He denies CP, palpitations, dizziness, headaches, peripheral edema.  
Statement Selected

## 2020-06-23 NOTE — PROGRESS NOTES
Chief Complaint   Patient presents with   • Establish Care       HISTORY OF PRESENT ILLNESS: Patient is a 70 y.o. male, established patient who presents today to discuss medical problems as listed below:    Health Maintenance:  COMPLETED.    Essential hypertension  To me.  Chronic problem.  BP today is WNL.  Patient was originally diagnosed with HTN 20 years ago, taking lisinopril 10 mg daily.  He needs refills today.  He denies CP, palpitations, dizziness, headaches, peripheral edema.    Mixed hyperlipidemia  New to me.  This is a chronic issue.  Originally diagnosed 20 years ago.  Currently taking atorvastatin 10 mg, tolerating well.  He needs refills today.  He denies CP, palpitations, dizziness, headaches, lower extremity swelling.  Last lipid panel was last year with elevated triglycerides.  His last A1c last year was 6, his BP is well controlled.  No family history of lumbar stroke as patient was adopted.      Patient Active Problem List    Diagnosis Date Noted   • Preoperative clearance 08/20/2019   • Prediabetes 07/30/2019   • OA (osteoarthritis) 07/30/2019   • Essential hypertension 07/22/2019   • Mixed hyperlipidemia 07/22/2019   • Herpes 07/22/2019   • Rosacea, acne 07/22/2019        Allergies: Nkda [no known drug allergy]    Current Outpatient Medications   Medication Sig Dispense Refill   • lisinopril (PRINIVIL) 10 MG Tab Take 1 Tab by mouth every day. 90 Tab 0   • atorvastatin (LIPITOR) 10 MG Tab Take 1 Tab by mouth every day. 90 Tab 0   • valacyclovir (VALTREX) 1 GM Tab Take 1 Tab by mouth 2 times a day. 90 Tab 0   • acyclovir (ZOVIRAX) 5 % Ointment Apply 1 Application to affected area(s) every 3 hours as needed. 1 Tube 3   • metronidazole (METROGEL) 0.75 % gel Apply 1 Application to affected area(s) every day. 1 Tube 3     No current facility-administered medications for this visit.        Social History     Tobacco Use   • Smoking status: Never Smoker   • Smokeless tobacco: Never Used   Substance  "Use Topics   • Alcohol use: Yes     Alcohol/week: 1.2 oz     Types: 2 Cans of beer per week     Frequency: 2-4 times a month   • Drug use: No     Social History     Social History Narrative   • Not on file       Family History   Adopted: Yes       Allergies, past medical history, past surgical history, family history, social history reviewed and updated.    Review of Systems:     - Constitutional: Negative for fever, chills, unexpected weight change, and fatigue/generalized weakness.     - HEENT: Negative for headaches, vision changes, hearing changes, ear pain, ear discharge, rhinorrhea, sinus congestion, sore throat, and neck pain.      - Respiratory: Negative for cough, sputum production, chest congestion, dyspnea, wheezing, and crackles.      - Cardiovascular: Negative for chest pain, palpitations, orthopnea, and bilateral lower extremity edema.     - Gastrointestinal: Negative for heartburn, nausea, vomiting, abdominal pain, hematochezia, melena, diarrhea, constipation, and greasy/foul-smelling stools.     - Genitourinary: Negative for dysuria, polyuria, hematuria, pyuria, urinary urgency, and urinary incontinence.    - Musculoskeletal: Negative for myalgias, back pain, and joint pain.     - Skin: Negative for rash, itching, cyanotic skin color change.     - Neurological: Negative for dizziness, tingling, tremors, focal sensory deficit, focal weakness and headaches.     - Endo/Heme/Allergies: Does not bruise/bleed easily.     - Psychiatric/Behavioral: Negative for depression, suicidal/homicidal ideation and memory loss.      All other systems reviewed and are negative    Exam:    /80 (BP Location: Left arm, Patient Position: Sitting, BP Cuff Size: Adult)   Pulse 60   Temp 37.3 °C (99.1 °F) (Temporal)   Resp 16   Ht 1.803 m (5' 11\")   Wt 98 kg (216 lb)   SpO2 96%   BMI 30.13 kg/m²  Body mass index is 30.13 kg/m².    Physical Exam:  Constitutional: Well-developed and well-nourished. Not diaphoretic. " No distress.   Skin: Skin is warm and dry. No rash noted.  Head: Atraumatic without lesions.  Eyes: Conjunctivae and extraocular motions are normal. Pupils are equal, round, and reactive to light. No scleral icterus.   Ears:  External ears unremarkable. Tympanic membranes clear and intact.  Nose: Nares patent. Septum midline. Turbinates without erythema nor edema. No discharge.   Mouth/Throat: Dentition is normal. Tongue normal. Oropharynx is clear and moist. Posterior pharynx without erythema or exudates.  Neck: Supple, trachea midline. Normal range of motion. No thyromegaly present. No lymphadenopathy--cervical or supraclavicular.  Cardiovascular: Regular rate and rhythm, S1 and S2 without murmur, rubs, or gallops.    Chest: Effort normal. Clear to auscultation throughout. No adventitious sounds. No CVA tenderness.  Abdomen: Soft, non tender, and without distention. Active bowel sounds in all four quadrants. No rebound, guarding, masses or HSM.  : Negative for dysuria, polyuria, hematuria, pyuria, urinary urgency, and urinary incontinence.  Extremities: No cyanosis, clubbing, erythema, nor edema. Distal pulses intact and symmetric.   Musculoskeletal: All major joints AROM full in all directions without pain.  Neurological: Alert and oriented x 3. DTRs 2+/3 and symmetric. No cranial nerve deficit. 5/5 myotomes. Sensation intact. Negative Rhomberg.  Psychiatric:  Behavior, mood, and affect are appropriate.  MA/nursing note and vitals reviewed.    LABS: 2019  results reviewed and discussed with the patient, questions answered.    Assessment/Plan:  1. Need for hepatitis C screening test  - HCV Scrn ( 5020-0310 1xLife); Future    2. Need for vaccination  - Tdap Vaccine =>6YO IM    3. Essential hypertension  Well-controlled.  Continue current medications.  - lisinopril (PRINIVIL) 10 MG Tab; Take 1 Tab by mouth every day.  Dispense: 90 Tab; Refill: 0  - Comp Metabolic Panel; Future  - CBC WITH DIFFERENTIAL;  Future  - TSH; Future  - Lipid Profile; Future  - HEMOGLOBIN A1C; Future    4. Mixed hyperlipidemia  Stable.  Continue current medications.  We will recheck labs and discuss findings with patient.  - atorvastatin (LIPITOR) 10 MG Tab; Take 1 Tab by mouth every day.  Dispense: 90 Tab; Refill: 0  - Comp Metabolic Panel; Future  - CBC WITH DIFFERENTIAL; Future  - TSH; Future  - Lipid Profile; Future  - HEMOGLOBIN A1C; Future    5. Elevated hemoglobin A1c  Stable.  Will recheck labs and discuss findings with patient.  - HEMOGLOBIN A1C; Future       Discussed with patient possible alternative diagnoses, pt is to take all medications as prescribed. If symptoms persist FU w/PCP, if symptoms worsen go to emergency room. If experiencing any side effects from prescribed medications reports to the office immediately or go to emergency room.  Reviewed indication, dosage, usage and potential adverse effects of prescribed medications. Reviewed risks and benefits of treatment plan. Patient verbalizes understanding of all instruction and verbally agrees to plan.    Return if symptoms worsen or fail to improve.

## 2020-07-06 ENCOUNTER — HOSPITAL ENCOUNTER (OUTPATIENT)
Dept: LAB | Facility: MEDICAL CENTER | Age: 70
End: 2020-07-06
Attending: NURSE PRACTITIONER
Payer: MEDICARE

## 2020-07-06 DIAGNOSIS — R73.09 ELEVATED HEMOGLOBIN A1C: ICD-10-CM

## 2020-07-06 DIAGNOSIS — I10 ESSENTIAL HYPERTENSION: ICD-10-CM

## 2020-07-06 DIAGNOSIS — E78.2 MIXED HYPERLIPIDEMIA: ICD-10-CM

## 2020-07-06 DIAGNOSIS — Z11.59 NEED FOR HEPATITIS C SCREENING TEST: ICD-10-CM

## 2020-07-06 LAB
ALBUMIN SERPL BCP-MCNC: 4.2 G/DL (ref 3.2–4.9)
ALBUMIN/GLOB SERPL: 1.4 G/DL
ALP SERPL-CCNC: 38 U/L (ref 30–99)
ALT SERPL-CCNC: 21 U/L (ref 2–50)
ANION GAP SERPL CALC-SCNC: 12 MMOL/L (ref 7–16)
AST SERPL-CCNC: 23 U/L (ref 12–45)
BASOPHILS # BLD AUTO: 1.1 % (ref 0–1.8)
BASOPHILS # BLD: 0.04 K/UL (ref 0–0.12)
BILIRUB SERPL-MCNC: 0.6 MG/DL (ref 0.1–1.5)
BUN SERPL-MCNC: 17 MG/DL (ref 8–22)
CALCIUM SERPL-MCNC: 9.3 MG/DL (ref 8.4–10.2)
CHLORIDE SERPL-SCNC: 105 MMOL/L (ref 96–112)
CHOLEST SERPL-MCNC: 176 MG/DL (ref 100–199)
CO2 SERPL-SCNC: 25 MMOL/L (ref 20–33)
CREAT SERPL-MCNC: 0.94 MG/DL (ref 0.5–1.4)
EOSINOPHIL # BLD AUTO: 0.06 K/UL (ref 0–0.51)
EOSINOPHIL NFR BLD: 1.7 % (ref 0–6.9)
ERYTHROCYTE [DISTWIDTH] IN BLOOD BY AUTOMATED COUNT: 42.3 FL (ref 35.9–50)
EST. AVERAGE GLUCOSE BLD GHB EST-MCNC: 120 MG/DL
FASTING STATUS PATIENT QL REPORTED: NORMAL
GLOBULIN SER CALC-MCNC: 3 G/DL (ref 1.9–3.5)
GLUCOSE SERPL-MCNC: 102 MG/DL (ref 65–99)
HBA1C MFR BLD: 5.8 % (ref 0–5.6)
HCT VFR BLD AUTO: 47.2 % (ref 42–52)
HCV AB SER QL: NORMAL
HDLC SERPL-MCNC: 44 MG/DL
HGB BLD-MCNC: 15.4 G/DL (ref 14–18)
IMM GRANULOCYTES # BLD AUTO: 0 K/UL (ref 0–0.11)
IMM GRANULOCYTES NFR BLD AUTO: 0 % (ref 0–0.9)
LDLC SERPL CALC-MCNC: 105 MG/DL
LYMPHOCYTES # BLD AUTO: 0.99 K/UL (ref 1–4.8)
LYMPHOCYTES NFR BLD: 28 % (ref 22–41)
MCH RBC QN AUTO: 28.4 PG (ref 27–33)
MCHC RBC AUTO-ENTMCNC: 32.6 G/DL (ref 33.7–35.3)
MCV RBC AUTO: 86.9 FL (ref 81.4–97.8)
MONOCYTES # BLD AUTO: 0.31 K/UL (ref 0–0.85)
MONOCYTES NFR BLD AUTO: 8.8 % (ref 0–13.4)
NEUTROPHILS # BLD AUTO: 2.13 K/UL (ref 1.82–7.42)
NEUTROPHILS NFR BLD: 60.4 % (ref 44–72)
NRBC # BLD AUTO: 0 K/UL
NRBC BLD-RTO: 0 /100 WBC
PLATELET # BLD AUTO: 212 K/UL (ref 164–446)
PMV BLD AUTO: 9.6 FL (ref 9–12.9)
POTASSIUM SERPL-SCNC: 4.5 MMOL/L (ref 3.6–5.5)
PROT SERPL-MCNC: 7.2 G/DL (ref 6–8.2)
RBC # BLD AUTO: 5.43 M/UL (ref 4.7–6.1)
SODIUM SERPL-SCNC: 142 MMOL/L (ref 135–145)
TRIGL SERPL-MCNC: 137 MG/DL (ref 0–149)
TSH SERPL DL<=0.005 MIU/L-ACNC: 4.63 UIU/ML (ref 0.38–5.33)
WBC # BLD AUTO: 3.5 K/UL (ref 4.8–10.8)

## 2020-07-06 PROCEDURE — 80053 COMPREHEN METABOLIC PANEL: CPT

## 2020-07-06 PROCEDURE — 84443 ASSAY THYROID STIM HORMONE: CPT

## 2020-07-06 PROCEDURE — 85025 COMPLETE CBC W/AUTO DIFF WBC: CPT

## 2020-07-06 PROCEDURE — 83036 HEMOGLOBIN GLYCOSYLATED A1C: CPT | Mod: GA

## 2020-07-06 PROCEDURE — 36415 COLL VENOUS BLD VENIPUNCTURE: CPT | Mod: GA

## 2020-07-06 PROCEDURE — G0472 HEP C SCREEN HIGH RISK/OTHER: HCPCS | Mod: GA

## 2020-07-06 PROCEDURE — 80061 LIPID PANEL: CPT

## 2020-07-14 ENCOUNTER — OFFICE VISIT (OUTPATIENT)
Dept: MEDICAL GROUP | Facility: PHYSICIAN GROUP | Age: 70
End: 2020-07-14
Payer: MEDICARE

## 2020-07-14 VITALS
OXYGEN SATURATION: 96 % | RESPIRATION RATE: 16 BRPM | WEIGHT: 210.4 LBS | DIASTOLIC BLOOD PRESSURE: 76 MMHG | HEIGHT: 71 IN | TEMPERATURE: 98 F | HEART RATE: 56 BPM | SYSTOLIC BLOOD PRESSURE: 116 MMHG | BODY MASS INDEX: 29.46 KG/M2

## 2020-07-14 DIAGNOSIS — R73.03 PREDIABETES: ICD-10-CM

## 2020-07-14 DIAGNOSIS — D72.819 LEUKOPENIA, UNSPECIFIED TYPE: ICD-10-CM

## 2020-07-14 DIAGNOSIS — E78.2 MIXED HYPERLIPIDEMIA: ICD-10-CM

## 2020-07-14 PROCEDURE — 99213 OFFICE O/P EST LOW 20 MIN: CPT | Performed by: NURSE PRACTITIONER

## 2020-07-14 ASSESSMENT — FIBROSIS 4 INDEX: FIB4 SCORE: 1.68

## 2020-07-14 NOTE — ASSESSMENT & PLAN NOTE
Reviewed his recent labs, his recent A1c is 5.8, which is a significant improvement from previous value last year at 6.  Patient admits to improving his lifestyle, including diet and daily exercise.

## 2020-07-14 NOTE — ASSESSMENT & PLAN NOTE
Reviewed recent labs, significant improvement in decreasing triglycerides to 137 from previous value 178.  Patient denies CP, dyspnea, dizziness, changes in vision, headaches, peripheral edema.  No family history of strokes or MI.  Discussed and educated on the 10-year risk, as indicated below.  His BP is well controlled on lisinopril, he is latest A1c is 5.8.   Ref. Range 7/6/2020 09:20   Cholesterol,Tot Latest Ref Range: 100 - 199 mg/dL 176   Triglycerides Latest Ref Range: 0 - 149 mg/dL 137   HDL Latest Ref Range: >=40 mg/dL 44   LDL Latest Ref Range: <100 mg/dL 105 (H)   The 10-year ASCVD risk score (Kris PATRICK Jr., et al., 2013) is: 18.9%    Values used to calculate the score:      Age: 70 years      Sex: Male      Is Non- : No      Diabetic: No      Tobacco smoker: No      Systolic Blood Pressure: 116 mmHg      Is BP treated: Yes      HDL Cholesterol: 44 mg/dL      Total Cholesterol: 176 mg/dL

## 2020-07-14 NOTE — PROGRESS NOTES
Chief Complaint   Patient presents with   • Follow-Up     Lab Review        HISTORY OF PRESENT ILLNESS: Patient is a 70 y.o. male, established patient who presents today to discuss medical problems as listed below:    Health Maintenance:  COMPLETED     Mixed hyperlipidemia  Reviewed recent labs, significant improvement in decreasing triglycerides to 137 from previous value 178.  Patient denies CP, dyspnea, dizziness, changes in vision, headaches, peripheral edema.  No family history of strokes or MI.  Discussed and educated on the 10-year risk, as indicated below.  His BP is well controlled on lisinopril, he is latest A1c is 5.8.   Ref. Range 7/6/2020 09:20   Cholesterol,Tot Latest Ref Range: 100 - 199 mg/dL 176   Triglycerides Latest Ref Range: 0 - 149 mg/dL 137   HDL Latest Ref Range: >=40 mg/dL 44   LDL Latest Ref Range: <100 mg/dL 105 (H)   The 10-year ASCVD risk score (Kris PATRICK Jr., et al., 2013) is: 18.9%    Values used to calculate the score:      Age: 70 years      Sex: Male      Is Non- : No      Diabetic: No      Tobacco smoker: No      Systolic Blood Pressure: 116 mmHg      Is BP treated: Yes      HDL Cholesterol: 44 mg/dL      Total Cholesterol: 176 mg/dL    Prediabetes  Reviewed his recent labs, his recent A1c is 5.8, which is a significant improvement from previous value last year at 6.  Patient admits to improving his lifestyle, including diet and daily exercise.    WBC decreased  Reviewed current labs, noted decreased WBCs at 3.5, this has been trending down from previous value last year at 3.8.  Patient denies fevers, chills, no unwanted weight loss, no history of malignancy.  He does admit to better align seasonal allergies.  He denies weakness, fatigue.      Patient Active Problem List    Diagnosis Date Noted   • WBC decreased 07/14/2020   • Preoperative clearance 08/20/2019   • Prediabetes 07/30/2019   • OA (osteoarthritis) 07/30/2019   • Essential hypertension 07/22/2019  "  • Mixed hyperlipidemia 07/22/2019   • Herpes 07/22/2019   • Rosacea, acne 07/22/2019        Allergies: Nkda [no known drug allergy]    Current Outpatient Medications   Medication Sig Dispense Refill   • lisinopril (PRINIVIL) 10 MG Tab Take 1 Tab by mouth every day. 90 Tab 0   • atorvastatin (LIPITOR) 10 MG Tab Take 1 Tab by mouth every day. 90 Tab 0   • valacyclovir (VALTREX) 1 GM Tab Take 1 Tab by mouth 2 times a day. 90 Tab 0   • acyclovir (ZOVIRAX) 5 % Ointment Apply 1 Application to affected area(s) every 3 hours as needed. 1 Tube 3   • metronidazole (METROGEL) 0.75 % gel Apply 1 Application to affected area(s) every day. 1 Tube 3     No current facility-administered medications for this visit.        Social History     Tobacco Use   • Smoking status: Never Smoker   • Smokeless tobacco: Never Used   Substance Use Topics   • Alcohol use: Yes     Alcohol/week: 1.2 oz     Types: 2 Cans of beer per week     Frequency: 2-4 times a month   • Drug use: No     Social History     Social History Narrative   • Not on file       Family History   Adopted: Yes       Allergies, past medical history, past surgical history, family history, social history reviewed and updated.    Review of Systems:     - Constitutional: Negative for fever, chills, unexpected weight change, and fatigue/generalized weakness.     - Respiratory: Negative for cough, sputum production, chest congestion, dyspnea, wheezing, and crackles.      - Cardiovascular: Negative for chest pain, palpitations, orthopnea, and bilateral lower extremity edema.    - Psychiatric/Behavioral: Negative for depression, suicidal/homicidal ideation and memory loss.      All other systems reviewed and are negative    Exam:    /76 (BP Location: Left arm, Patient Position: Sitting, BP Cuff Size: Adult)   Pulse (!) 56   Temp 36.7 °C (98 °F) (Temporal)   Resp 16   Ht 1.803 m (5' 11\")   Wt 95.4 kg (210 lb 6.4 oz)   SpO2 96%   BMI 29.34 kg/m²  Body mass index is 29.34 " kg/m².    Physical Exam:  Constitutional: Well-developed and well-nourished. Not diaphoretic. No distress.   Cardiovascular: Regular rate and rhythm, S1 and S2 without murmur, rubs, or gallops.    Chest: Effort normal. Clear to auscultation throughout. No adventitious sounds.  Neurological: Alert and oriented x 3.   Psychiatric:  Behavior, mood, and affect are appropriate.  MA/nursing note and vitals reviewed.    LABS:  7/6 results reviewed and discussed with the patient, questions answered.    Patient was seen for 15 minutes face to face of which > 50% of appointment time was spent on counseling and coordination of care regarding the above.     Assessment/Plan:  1. Mixed hyperlipidemia  Well-controlled with statin and healthy lifestyle.  Continue current regimen.  No refills needed today.    2. Prediabetes  Significantly improved.  Continue healthy lifestyle.    3.  Decreased WBCs.  Will monitor labs for trend.       Discussed with patient possible alternative diagnoses, pt is to take all medications as prescribed. If symptoms persist FU w/PCP, if symptoms worsen go to emergency room. If experiencing any side effects from prescribed medications reports to the office immediately or go to emergency room.  Reviewed indication, dosage, usage and potential adverse effects of prescribed medications. Reviewed risks and benefits of treatment plan. Patient verbalizes understanding of all instruction and verbally agrees to plan.    Return if symptoms worsen or fail to improve.

## 2020-07-14 NOTE — ASSESSMENT & PLAN NOTE
Reviewed current labs, noted decreased WBCs at 3.5, this has been trending down from previous value last year at 3.8.  Patient denies fevers, chills, no unwanted weight loss, no history of malignancy.  He does admit to better align seasonal allergies.  He denies weakness, fatigue.

## 2020-09-01 ENCOUNTER — HOSPITAL ENCOUNTER (EMERGENCY)
Facility: MEDICAL CENTER | Age: 70
End: 2020-09-01
Attending: EMERGENCY MEDICINE
Payer: MEDICARE

## 2020-09-01 VITALS
DIASTOLIC BLOOD PRESSURE: 82 MMHG | WEIGHT: 207.45 LBS | SYSTOLIC BLOOD PRESSURE: 172 MMHG | TEMPERATURE: 97.5 F | RESPIRATION RATE: 16 BRPM | OXYGEN SATURATION: 100 % | HEART RATE: 60 BPM | BODY MASS INDEX: 28.93 KG/M2

## 2020-09-01 DIAGNOSIS — S66.822A EXTENSOR TENDON LACERATION OF LEFT HAND WITH OPEN WOUND, INITIAL ENCOUNTER: ICD-10-CM

## 2020-09-01 DIAGNOSIS — S61.402A EXTENSOR TENDON LACERATION OF LEFT HAND WITH OPEN WOUND, INITIAL ENCOUNTER: ICD-10-CM

## 2020-09-01 DIAGNOSIS — S61.412A LACERATION OF LEFT HAND WITHOUT FOREIGN BODY, INITIAL ENCOUNTER: ICD-10-CM

## 2020-09-01 PROCEDURE — 303747 HCHG EXTRA SUTURE: Mod: EDC

## 2020-09-01 PROCEDURE — 302874 HCHG BANDAGE ACE 2 OR 3"": Mod: EDC

## 2020-09-01 PROCEDURE — 304999 HCHG REPAIR-SIMPLE/INTERMED LEVEL 1: Mod: EDC

## 2020-09-01 PROCEDURE — 304217 HCHG IRRIGATION SYSTEM: Mod: EDC

## 2020-09-01 PROCEDURE — 700101 HCHG RX REV CODE 250: Mod: EDC | Performed by: EMERGENCY MEDICINE

## 2020-09-01 PROCEDURE — 29125 APPL SHORT ARM SPLINT STATIC: CPT | Mod: EDC

## 2020-09-01 PROCEDURE — 99283 EMERGENCY DEPT VISIT LOW MDM: CPT | Mod: EDC

## 2020-09-01 RX ORDER — LIDOCAINE HYDROCHLORIDE 10 MG/ML
20 INJECTION, SOLUTION INFILTRATION; PERINEURAL ONCE
Status: COMPLETED | OUTPATIENT
Start: 2020-09-01 | End: 2020-09-01

## 2020-09-01 RX ORDER — CEPHALEXIN 500 MG/1
500 CAPSULE ORAL 2 TIMES DAILY
Qty: 20 CAP | Refills: 0 | Status: SHIPPED | OUTPATIENT
Start: 2020-09-01 | End: 2021-06-24

## 2020-09-01 RX ADMIN — LIDOCAINE HYDROCHLORIDE 8 ML: 10 INJECTION, SOLUTION INFILTRATION; PERINEURAL at 14:30

## 2020-09-01 SDOH — HEALTH STABILITY: MENTAL HEALTH: HOW OFTEN DO YOU HAVE A DRINK CONTAINING ALCOHOL?: 2-3 TIMES A WEEK

## 2020-09-01 ASSESSMENT — LIFESTYLE VARIABLES
DOES PATIENT WANT TO STOP DRINKING: NO
DO YOU DRINK ALCOHOL: NO

## 2020-09-01 ASSESSMENT — FIBROSIS 4 INDEX: FIB4 SCORE: 1.68

## 2020-09-01 NOTE — ED NOTES
Educated patient on discharge instructions, rx medications antibiotics, and follow up with ortho, Marc Vilchis M.D.  [address] [place] [id number] [phone]    In 1 day  1.  Elevate hand; 2.  Antibiotics; 3.  Follow-up with orthopedic hand surgery for further evaluation of extensor tendon injury    ; voiced understanding rec'vd. VS stable, BP (!) 172/82   Pulse 60   Temp 36.4 °C (97.5 °F) (Temporal)   Resp 16   Wt 94.1 kg (207 lb 7.3 oz)   SpO2 100%   BMI 28.93 kg/m²    Patient alert and appropriate. Skin PWD. NAD. All questions and concerns addressed. No further questions or concerns at this time. ERP aware of VS. Patient reports he had TDAP on 6/23/2020 recently. ERP aware, order cancelled.

## 2020-09-01 NOTE — ED NOTES
Agree with triage note.  Pt states using a hand saw while cutting tree branches.  Pt states he can not raise thumb, but he can bend his thumb down.  Bleeding controlled, denies any numbness or tingling.  Pt is unaware of his last tetanus vaccination.

## 2020-09-01 NOTE — ED PROVIDER NOTES
ED Provider Note    CHIEF COMPLAINT  Chief Complaint   Patient presents with   • Hand Laceration     left tumb laceration; pt was cutting tree branches and lacerated his thumb with hand saw; pt states he is unable to move thumb, bleeding controlled, CNS intact. Tetnus status unknown.        HPI  Gosia Tejada is a 70 y.o. male who presents for evaluation of a left hand laceration.  The patient was cutting some branches off of a tree when he had a hand saw come off a branch and cut the backside of his left hand/thumb area.  The patient denies any numbness in the thumb but states he has inability to extend the thumb the patient denies any other injury or complaints.  No recent illness.    REVIEW OF SYSTEMS  See HPI for further details.  Patient does relate a history all total hip arthroplasty performed by Dr. Lucien Fierro.  Other systems negative.    PAST MEDICAL HISTORY  Past Medical History:   Diagnosis Date   • Arthritis    • Heart burn    • Hyperlipemia    • Hypertension    • Rosacea    • Seasonal allergies        FAMILY HISTORY  Family History   Adopted: Yes       SOCIAL HISTORY  Resides locally;    SURGICAL HISTORY  Past Surgical History:   Procedure Laterality Date   • HIP ARTHROPLASTY TOTAL  11/20/2014    Left; Performed by Lucien Fierro M.D. at [facility]   • JOINT INJECTION DIAGNOSTIC  2/20/2014    Performed by Lucien Fierro M.D. at [facility]   • JOINT INJECTION DIAGNOSTIC  9/12/2013    Performed by Lucien Fierro M.D. at [facility]   • OTHER  1955    TONSILLECTOMY   • OTHER      VASECTOMY   • TONSILLECTOMY         CURRENT MEDICATIONS  Home Medications     Reviewed by Celeste Prater R.N. (Registered Nurse) on 09/01/20 at 1254  Med List Status: Complete   Medication Last Dose Status   acyclovir (ZOVIRAX) 5 % Ointment  Active   atorvastatin (LIPITOR) 10 MG Tab  Active   lisinopril (PRINIVIL) 10 MG Tab  Active   metronidazole (METROGEL) 0.75 % gel  Active    valacyclovir (VALTREX) 1 GM Tab  Active            Aspirin    ALLERGIES  Allergies   Allergen Reactions   • Nkda [No Known Drug Allergy]        PHYSICAL EXAM  VITAL SIGNS: /88   Pulse 81   Temp 36 °C (96.8 °F) (Temporal)   Resp 16   Wt 94.1 kg (207 lb 7.3 oz)   SpO2 95%   BMI 28.93 kg/m²    Constitutional: 70-year-old male, Well nourished, No acute distress, Non-toxic appearance.   HENT: Normocephalic, Atraumatic,  Cardiovascular: Regular rate and rhythm without mumurs, gallups, rubs   Thorax & Lungs: Normal Equal breath sounds, No respiratory distress, No wheezing, no stridor, no rales. No chest tenderness.   Skin: Good skin turgor, pink, warm, dry. No rashes, petechiae, purpura. Normal capillary refill.   Extremities: Intact distal pulses, No edema, No tenderness, No cyanosis,  Vascular: Pulses are 2+, symmetric in the upper and lower extremities.  Musculoskeletal: Left hand: The patient has several jagged lacerations, 3 cm, over the dorsal aspect of the left hand proximal to the MCP joint; motor and sensory are intact; the patient has inability to extend the thumb; local exploration reveals what appears to be partial to possibly near transection of the extensor tendon;  Neurologic: Alert & oriented x 3,  No gross focal deficits noted.   Psychiatric: Affect normal, Judgment normal, Mood normal.       RADIOLOGY/PROCEDURES  1.  Suture note    COURSE & MEDICAL DECISION MAKING  Pertinent Labs & Imaging studies reviewed. (See chart for details)  1.  Tetanus    Procedure note: The patient's laceration was anesthetized with 1% lidocaine.  The wound was thoroughly irrigated with high-pressure normal saline.  The skin wound was cleansed with diluted Betadine prep.  The lacerations were complex and required taking large bites with 4-0 Ethilon to lay down and approximate the wounds.  Total of 7 interrupted sutures were placed.  The wound was cleansed and dressed.  Thumb spica splint  applied    Discussion/consultation: At this time, the patient has multiple skin lacerations but also has findings consistent with extensor pollicis longus and possible brevis laceration.  The patient has inability to extend the thumb suggesting complete transection.  I was able to visualize a part of the extensor tendon laceration.  I spoke with orthopedic/hand surgery on-call, Dr. Jarrett.  He requested that I suture the wound appropriately bandage and splint the wound.  He requested the patient follow-up with his partner, Dr. Vilchis who specializes in orthopedic hand injuries and treatment.  I discussed the findings and treatment plan with the patient.  He indicates he is comfortable with this explanation disposition.    FINAL IMPRESSION  1. Laceration of left hand without foreign body, initial encounter Acute   2. Extensor tendon laceration of left hand with open wound, initial encounter        PLAN  1.  Appropriate discharge instructions given  2.  Keflex 500 mg twice daily #20  3.  Follow-up with orthopedic hand surgery, as discussed    Electronically signed by: Guy G Gansert, M.D., 9/1/2020 1:35 PM

## 2020-09-01 NOTE — ED TRIAGE NOTES
Gosia Tejada  70 y.o. male  Chief Complaint   Patient presents with   • Hand Laceration     left tumb laceration; pt was cutting tree branches and lacerated his thumb with hand saw; pt states he is unable to move thumb, bleeding controlled, CNS intact. Tetnus status unknown.        Pt amb to triage with steady gait for above complaint. Pt applying pressure to wound.   Pt is alert and oriented, speaking in full sentences, follows commands and responds appropriately to questions. Resp are even and unlabored. No behavioral indicators of pain.   Pt placed in lobby. Pt educated on triage process. Pt encouraged to alert staff for any changes.

## 2020-09-09 ENCOUNTER — PRE-ADMISSION TESTING (OUTPATIENT)
Dept: ADMISSIONS | Facility: MEDICAL CENTER | Age: 70
End: 2020-09-09
Attending: ORTHOPAEDIC SURGERY
Payer: MEDICARE

## 2020-09-09 DIAGNOSIS — Z01.812 PRE-OPERATIVE LABORATORY EXAMINATION: ICD-10-CM

## 2020-09-09 DIAGNOSIS — Z01.810 PRE-OPERATIVE CARDIOVASCULAR EXAMINATION: ICD-10-CM

## 2020-09-09 LAB
ANION GAP SERPL CALC-SCNC: 14 MMOL/L (ref 7–16)
BUN SERPL-MCNC: 13 MG/DL (ref 8–22)
CALCIUM SERPL-MCNC: 9.3 MG/DL (ref 8.5–10.5)
CHLORIDE SERPL-SCNC: 99 MMOL/L (ref 96–112)
CO2 SERPL-SCNC: 24 MMOL/L (ref 20–33)
COVID ORDER STATUS COVID19: NORMAL
CREAT SERPL-MCNC: 1.08 MG/DL (ref 0.5–1.4)
EKG IMPRESSION: NORMAL
GLUCOSE SERPL-MCNC: 92 MG/DL (ref 65–99)
POTASSIUM SERPL-SCNC: 4.3 MMOL/L (ref 3.6–5.5)
SARS-COV-2 RNA RESP QL NAA+PROBE: NOTDETECTED
SODIUM SERPL-SCNC: 137 MMOL/L (ref 135–145)
SPECIMEN SOURCE: NORMAL

## 2020-09-09 PROCEDURE — C9803 HOPD COVID-19 SPEC COLLECT: HCPCS

## 2020-09-09 PROCEDURE — 93010 ELECTROCARDIOGRAM REPORT: CPT | Performed by: INTERNAL MEDICINE

## 2020-09-09 PROCEDURE — 80048 BASIC METABOLIC PNL TOTAL CA: CPT

## 2020-09-09 PROCEDURE — U0003 INFECTIOUS AGENT DETECTION BY NUCLEIC ACID (DNA OR RNA); SEVERE ACUTE RESPIRATORY SYNDROME CORONAVIRUS 2 (SARS-COV-2) (CORONAVIRUS DISEASE [COVID-19]), AMPLIFIED PROBE TECHNIQUE, MAKING USE OF HIGH THROUGHPUT TECHNOLOGIES AS DESCRIBED BY CMS-2020-01-R: HCPCS

## 2020-09-09 PROCEDURE — 93005 ELECTROCARDIOGRAM TRACING: CPT

## 2020-09-09 PROCEDURE — 36415 COLL VENOUS BLD VENIPUNCTURE: CPT

## 2020-09-09 RX ORDER — IBUPROFEN 800 MG/1
800 TABLET ORAL EVERY 8 HOURS PRN
COMMUNITY
End: 2021-06-24

## 2020-09-09 RX ORDER — ACETAMINOPHEN 325 MG/1
650 TABLET ORAL EVERY 4 HOURS PRN
COMMUNITY
End: 2021-06-24

## 2020-09-09 ASSESSMENT — FIBROSIS 4 INDEX: FIB4 SCORE: 1.68

## 2020-09-10 NOTE — OR NURSING
COVID-19 Pre-surgery screenin. Do you have an undiagnosed respiratory illness or symptoms such as coughing or sneezing? no (Yes/No)  a. Onset of Sx na  b. Acute vs. chronic respiratory illness na    2. Do you have an unexplained fever greater than 100.4 degrees Fahrenheit or 38 degrees Celsius?     no (Yes/No)    3. Have you had direct exposure to a patient who tested positive for Covid-19?    no (Yes/No)    4. Have you had any loss of your sense of taste or smell? Have you had N/V or sore throat? no    Patient has been informed of visitor policy and asked to wear a mask upon entering the hospital   yes (Yes/No)

## 2020-09-11 ENCOUNTER — ANESTHESIA EVENT (OUTPATIENT)
Dept: SURGERY | Facility: MEDICAL CENTER | Age: 70
End: 2020-09-11
Payer: MEDICARE

## 2020-09-11 ENCOUNTER — ANESTHESIA (OUTPATIENT)
Dept: SURGERY | Facility: MEDICAL CENTER | Age: 70
End: 2020-09-11
Payer: MEDICARE

## 2020-09-11 ENCOUNTER — HOSPITAL ENCOUNTER (OUTPATIENT)
Facility: MEDICAL CENTER | Age: 70
End: 2020-09-11
Attending: ORTHOPAEDIC SURGERY | Admitting: ORTHOPAEDIC SURGERY
Payer: MEDICARE

## 2020-09-11 VITALS
HEART RATE: 63 BPM | RESPIRATION RATE: 16 BRPM | SYSTOLIC BLOOD PRESSURE: 150 MMHG | HEIGHT: 71 IN | OXYGEN SATURATION: 97 % | TEMPERATURE: 97.7 F | WEIGHT: 205.03 LBS | DIASTOLIC BLOOD PRESSURE: 84 MMHG | BODY MASS INDEX: 28.7 KG/M2

## 2020-09-11 PROCEDURE — 160035 HCHG PACU - 1ST 60 MINS PHASE I: Performed by: ORTHOPAEDIC SURGERY

## 2020-09-11 PROCEDURE — 500881 HCHG PACK, EXTREMITY: Performed by: ORTHOPAEDIC SURGERY

## 2020-09-11 PROCEDURE — 160039 HCHG SURGERY MINUTES - EA ADDL 1 MIN LEVEL 3: Performed by: ORTHOPAEDIC SURGERY

## 2020-09-11 PROCEDURE — 700101 HCHG RX REV CODE 250: Performed by: ANESTHESIOLOGY

## 2020-09-11 PROCEDURE — 700111 HCHG RX REV CODE 636 W/ 250 OVERRIDE (IP): Performed by: ANESTHESIOLOGY

## 2020-09-11 PROCEDURE — 700105 HCHG RX REV CODE 258: Performed by: ORTHOPAEDIC SURGERY

## 2020-09-11 PROCEDURE — 700102 HCHG RX REV CODE 250 W/ 637 OVERRIDE(OP): Performed by: ANESTHESIOLOGY

## 2020-09-11 PROCEDURE — 160025 RECOVERY II MINUTES (STATS): Performed by: ORTHOPAEDIC SURGERY

## 2020-09-11 PROCEDURE — A9270 NON-COVERED ITEM OR SERVICE: HCPCS | Performed by: ANESTHESIOLOGY

## 2020-09-11 PROCEDURE — C1713 ANCHOR/SCREW BN/BN,TIS/BN: HCPCS | Performed by: ORTHOPAEDIC SURGERY

## 2020-09-11 PROCEDURE — 160002 HCHG RECOVERY MINUTES (STAT): Performed by: ORTHOPAEDIC SURGERY

## 2020-09-11 PROCEDURE — 160046 HCHG PACU - 1ST 60 MINS PHASE II: Performed by: ORTHOPAEDIC SURGERY

## 2020-09-11 PROCEDURE — 160009 HCHG ANES TIME/MIN: Performed by: ORTHOPAEDIC SURGERY

## 2020-09-11 PROCEDURE — 700102 HCHG RX REV CODE 250 W/ 637 OVERRIDE(OP)

## 2020-09-11 PROCEDURE — A9270 NON-COVERED ITEM OR SERVICE: HCPCS

## 2020-09-11 PROCEDURE — 700111 HCHG RX REV CODE 636 W/ 250 OVERRIDE (IP): Performed by: ORTHOPAEDIC SURGERY

## 2020-09-11 PROCEDURE — 501838 HCHG SUTURE GENERAL: Performed by: ORTHOPAEDIC SURGERY

## 2020-09-11 PROCEDURE — 160028 HCHG SURGERY MINUTES - 1ST 30 MINS LEVEL 3: Performed by: ORTHOPAEDIC SURGERY

## 2020-09-11 PROCEDURE — 160048 HCHG OR STATISTICAL LEVEL 1-5: Performed by: ORTHOPAEDIC SURGERY

## 2020-09-11 DEVICE — WIRE K- SMOOTH .062 - (3TX6=18): Type: IMPLANTABLE DEVICE | Site: HAND | Status: FUNCTIONAL

## 2020-09-11 RX ORDER — CELECOXIB 200 MG/1
200 CAPSULE ORAL ONCE
Status: COMPLETED | OUTPATIENT
Start: 2020-09-11 | End: 2020-09-11

## 2020-09-11 RX ORDER — SODIUM CHLORIDE, SODIUM LACTATE, POTASSIUM CHLORIDE, CALCIUM CHLORIDE 600; 310; 30; 20 MG/100ML; MG/100ML; MG/100ML; MG/100ML
INJECTION, SOLUTION INTRAVENOUS CONTINUOUS
Status: DISCONTINUED | OUTPATIENT
Start: 2020-09-11 | End: 2020-09-11 | Stop reason: HOSPADM

## 2020-09-11 RX ORDER — OXYCODONE HCL 5 MG/5 ML
10 SOLUTION, ORAL ORAL
Status: COMPLETED | OUTPATIENT
Start: 2020-09-11 | End: 2020-09-11

## 2020-09-11 RX ORDER — CEFAZOLIN SODIUM 1 G/3ML
INJECTION, POWDER, FOR SOLUTION INTRAMUSCULAR; INTRAVENOUS PRN
Status: DISCONTINUED | OUTPATIENT
Start: 2020-09-11 | End: 2020-09-11 | Stop reason: SURG

## 2020-09-11 RX ORDER — DEXAMETHASONE SODIUM PHOSPHATE 4 MG/ML
INJECTION, SOLUTION INTRA-ARTICULAR; INTRALESIONAL; INTRAMUSCULAR; INTRAVENOUS; SOFT TISSUE PRN
Status: DISCONTINUED | OUTPATIENT
Start: 2020-09-11 | End: 2020-09-11 | Stop reason: SURG

## 2020-09-11 RX ORDER — ACETAMINOPHEN 500 MG
1000 TABLET ORAL ONCE
Status: COMPLETED | OUTPATIENT
Start: 2020-09-11 | End: 2020-09-11

## 2020-09-11 RX ORDER — OXYCODONE HCL 5 MG/5 ML
5 SOLUTION, ORAL ORAL
Status: COMPLETED | OUTPATIENT
Start: 2020-09-11 | End: 2020-09-11

## 2020-09-11 RX ORDER — BUPIVACAINE HYDROCHLORIDE 2.5 MG/ML
INJECTION, SOLUTION EPIDURAL; INFILTRATION; INTRACAUDAL
Status: DISCONTINUED | OUTPATIENT
Start: 2020-09-11 | End: 2020-09-11 | Stop reason: HOSPADM

## 2020-09-11 RX ORDER — ONDANSETRON 2 MG/ML
4 INJECTION INTRAMUSCULAR; INTRAVENOUS
Status: DISCONTINUED | OUTPATIENT
Start: 2020-09-11 | End: 2020-09-11 | Stop reason: HOSPADM

## 2020-09-11 RX ORDER — MIDAZOLAM HYDROCHLORIDE 1 MG/ML
INJECTION INTRAMUSCULAR; INTRAVENOUS PRN
Status: DISCONTINUED | OUTPATIENT
Start: 2020-09-11 | End: 2020-09-11 | Stop reason: SURG

## 2020-09-11 RX ORDER — DIPHENHYDRAMINE HYDROCHLORIDE 50 MG/ML
12.5 INJECTION INTRAMUSCULAR; INTRAVENOUS
Status: DISCONTINUED | OUTPATIENT
Start: 2020-09-11 | End: 2020-09-11 | Stop reason: HOSPADM

## 2020-09-11 RX ORDER — METOPROLOL TARTRATE 1 MG/ML
1 INJECTION, SOLUTION INTRAVENOUS
Status: DISCONTINUED | OUTPATIENT
Start: 2020-09-11 | End: 2020-09-11 | Stop reason: HOSPADM

## 2020-09-11 RX ORDER — BUPIVACAINE HYDROCHLORIDE 2.5 MG/ML
INJECTION, SOLUTION EPIDURAL; INFILTRATION; INTRACAUDAL
Status: DISCONTINUED
Start: 2020-09-11 | End: 2020-09-11 | Stop reason: HOSPADM

## 2020-09-11 RX ORDER — ONDANSETRON 2 MG/ML
INJECTION INTRAMUSCULAR; INTRAVENOUS PRN
Status: DISCONTINUED | OUTPATIENT
Start: 2020-09-11 | End: 2020-09-11 | Stop reason: SURG

## 2020-09-11 RX ORDER — HALOPERIDOL 5 MG/ML
1 INJECTION INTRAMUSCULAR
Status: DISCONTINUED | OUTPATIENT
Start: 2020-09-11 | End: 2020-09-11 | Stop reason: HOSPADM

## 2020-09-11 RX ADMIN — ONDANSETRON 4 MG: 2 INJECTION INTRAMUSCULAR; INTRAVENOUS at 14:35

## 2020-09-11 RX ADMIN — POVIDONE-IODINE 15 ML: 10 SOLUTION TOPICAL at 13:10

## 2020-09-11 RX ADMIN — DEXAMETHASONE SODIUM PHOSPHATE 8 MG: 4 INJECTION, SOLUTION INTRA-ARTICULAR; INTRALESIONAL; INTRAMUSCULAR; INTRAVENOUS; SOFT TISSUE at 14:35

## 2020-09-11 RX ADMIN — EPHEDRINE SULFATE 10 MG: 50 INJECTION INTRAMUSCULAR; INTRAVENOUS; SUBCUTANEOUS at 14:41

## 2020-09-11 RX ADMIN — FENTANYL CITRATE 100 MCG: 50 INJECTION INTRAMUSCULAR; INTRAVENOUS at 14:17

## 2020-09-11 RX ADMIN — CEFAZOLIN 2 G: 330 INJECTION, POWDER, FOR SOLUTION INTRAMUSCULAR; INTRAVENOUS at 14:21

## 2020-09-11 RX ADMIN — SODIUM CHLORIDE, POTASSIUM CHLORIDE, SODIUM LACTATE AND CALCIUM CHLORIDE: 600; 310; 30; 20 INJECTION, SOLUTION INTRAVENOUS at 14:05

## 2020-09-11 RX ADMIN — OXYCODONE HYDROCHLORIDE 5 MG: 5 SOLUTION ORAL at 15:34

## 2020-09-11 RX ADMIN — FENTANYL CITRATE 25 MCG: 50 INJECTION INTRAMUSCULAR; INTRAVENOUS at 15:33

## 2020-09-11 RX ADMIN — CELECOXIB 200 MG: 200 CAPSULE ORAL at 13:10

## 2020-09-11 RX ADMIN — ACETAMINOPHEN 1000 MG: 500 TABLET ORAL at 13:10

## 2020-09-11 RX ADMIN — MIDAZOLAM HYDROCHLORIDE 2 MG: 1 INJECTION, SOLUTION INTRAMUSCULAR; INTRAVENOUS at 14:11

## 2020-09-11 RX ADMIN — PROPOFOL 150 MG: 10 INJECTION, EMULSION INTRAVENOUS at 14:17

## 2020-09-11 RX ADMIN — SODIUM CHLORIDE, POTASSIUM CHLORIDE, SODIUM LACTATE AND CALCIUM CHLORIDE: 600; 310; 30; 20 INJECTION, SOLUTION INTRAVENOUS at 13:10

## 2020-09-11 ASSESSMENT — PAIN DESCRIPTION - PAIN TYPE
TYPE: SURGICAL PAIN

## 2020-09-11 ASSESSMENT — PAIN SCALES - GENERAL: PAIN_LEVEL: 0

## 2020-09-11 ASSESSMENT — FIBROSIS 4 INDEX: FIB4 SCORE: 1.68

## 2020-09-11 NOTE — DISCHARGE INSTRUCTIONS
ACTIVITY: Rest and take it easy for the first 24 hours.  A responsible adult is recommended to remain with you during that time.  It is normal to feel sleepy.  We encourage you to not do anything that requires balance, judgment or coordination.    MILD FLU-LIKE SYMPTOMS ARE NORMAL. YOU MAY EXPERIENCE GENERALIZED MUSCLE ACHES, THROAT IRRITATION, HEADACHE AND/OR SOME NAUSEA.    FOR 24 HOURS DO NOT:  Drive, operate machinery or run household appliances.  Drink beer or alcoholic beverages.   Make important decisions or sign legal documents.    SPECIAL INSTRUCTIONS: *SEE HAND INSTRUCTION SHEET  NON WEIGHT BEARING**    DIET: To avoid nausea, slowly advance diet as tolerated, avoiding spicy or greasy foods for the first day.  Add more substantial food to your diet according to your physician's instructions.  Babies can be fed formula or breast milk as soon as they are hungry.  INCREASE FLUIDS AND FIBER TO AVOID CONSTIPATION.    SURGICAL DRESSING/BATHING: *KEEP DRESSING CLEAN AND DRY  MAY SHOWER TOMORROW WITH DRESSING COVERED**    FOLLOW-UP APPOINTMENT:  A follow-up appointment should be arranged with your doctor in *FOLLOW UP WITH DR. RODARTE**; call to schedule.    You should CALL YOUR PHYSICIAN if you develop:  Fever greater than 101 degrees F.  Pain not relieved by medication, or persistent nausea or vomiting.  Excessive bleeding (blood soaking through dressing) or unexpected drainage from the wound.  Extreme redness or swelling around the incision site, drainage of pus or foul smelling drainage.  Inability to urinate or empty your bladder within 8 hours.  Problems with breathing or chest pain.    You should call 911 if you develop problems with breathing or chest pain.  If you are unable to contact your doctor or surgical center, you should go to the nearest emergency room or urgent care center.  Physician's telephone #: *DR. RODARTE [phone]**    If any questions arise, call your doctor.  If your doctor is not  available, please feel free to call the Surgical Center at [phone].  The Center is open Monday through Friday from 7AM to 7PM.  You can also call the HEALTH HOTLINE open 24 hours/day, 7 days/week and speak to a nurse at [phone], or toll free at [phone].    A registered nurse may call you a few days after your surgery to see how you are doing after your procedure.    MEDICATIONS: Resume taking daily medication.  Take prescribed pain medication with food.  If no medication is prescribed, you may take non-aspirin pain medication if needed.  PAIN MEDICATION CAN BE VERY CONSTIPATING.  Take a stool softener or laxative such as senokot, pericolace, or milk of magnesia if needed.    Prescription given for *NORCO**.  Last pain medication given at *3:30 PM*.    If your physician has prescribed pain medication that includes Acetaminophen (Tylenol), do not take additional Acetaminophen (Tylenol) while taking the prescribed medication.    Depression / Suicide Risk    As you are discharged from this [facility] facility, it is important to learn how to keep safe from harming yourself.    Recognize the warning signs:  · Abrupt changes in personality, positive or negative- including increase in energy   · Giving away possessions  · Change in eating patterns- significant weight changes-  positive or negative  · Change in sleeping patterns- unable to sleep or sleeping all the time   · Unwillingness or inability to communicate  · Depression  · Unusual sadness, discouragement and loneliness  · Talk of wanting to die  · Neglect of personal appearance   · Rebelliousness- reckless behavior  · Withdrawal from people/activities they love  · Confusion- inability to concentrate     If you or a loved one observes any of these behaviors or has concerns about self-harm, here's what you can do:  · Talk about it- your feelings and reasons for harming yourself  · Remove any means that you might use to hurt yourself (examples:  pills, rope, extension cords, firearm)  · Get professional help from the community (Mental Health, Substance Abuse, psychological counseling)  · Do not be alone:Call your Safe Contact- someone whom you trust who will be there for you.  · Call your local CRISIS HOTLINE 640-3870 or 241-370-0811  · Call your local Children's Mobile Crisis Response Team [place] (141) 943-7134 or www.[employer]  · Call the toll free National Suicide Prevention Hotlines   · National Suicide Prevention Lifeline 881-885-CTPL (5932)  · National Hope Line Network 800-SUICIDE (134-5825)

## 2020-09-11 NOTE — ANESTHESIA TIME REPORT
Anesthesia Start and Stop Event Times     Date Time Event    9/11/2020 1412 Anesthesia Start     1506 Anesthesia Stop        Responsible Staff  09/11/20    Name Role Begin End    Tristan Olivas M.D. Anesth 1412 1506        Preop Diagnosis (Free Text):  Pre-op Diagnosis     LACERATION WITHOUT FOREIGN BODY OF LEFT THUMB WITHOUT DAMAGE TO NAIL INITIAL ENCOUNTER, THUMB PAIN LEFT        Preop Diagnosis (Codes):    Post op Diagnosis  Tendon rupture of wrist      Premium Reason  Non-Premium    Comments:

## 2020-09-11 NOTE — ANESTHESIA POSTPROCEDURE EVALUATION
Patient: Gosia Tejada    Procedure Summary     Date: 09/11/20 Room / Location: [facility] ROOM 21 / SURGERY SAME DAY [facility]    Anesthesia Start: 1412 Anesthesia Stop: 1506    Procedure: REPAIR, TENDON, EXTENSOR-THUMB POLLICIS LONGUS VERSUS (Left Hand) Diagnosis: (LACERATION WITHOUT FOREIGN BODY OF LEFT THUMB WITHOUT DAMAGE TO NAIL INITIAL ENCOUNTER, THUMB PAIN LEFT)    Surgeon: Kathleen Rai M.D. Responsible Provider: Tristan Olivas M.D.    Anesthesia Type: general ASA Status: 2          Final Anesthesia Type: general  Last vitals  BP   Blood Pressure : 140/74    Temp   36.7 °C (98.1 °F)    Pulse   Pulse: 79   Resp   17    SpO2   99 %      Anesthesia Post Evaluation    Patient location during evaluation: PACU  Patient participation: complete - patient participated  Level of consciousness: awake and alert  Pain score: 0    Airway patency: patent  Anesthetic complications: no  Cardiovascular status: hemodynamically stable  Respiratory status: acceptable  Hydration status: euvolemic    PONV: none           Nurse Pain Score: 0 (NPRS)

## 2020-09-11 NOTE — OP REPORT
Orthopedic Surgery Operative Report    Pre-operative Diagnosis:  EPL laceration, left     Post-operative Diagnosis:  EPL laceration, zone TIII    Procedures Performed:  EPL repair    Anesthesia:  general    Anesthesiologist:  Deisy     Surgeon:  Kathleen Rai M.D.     Assistants:  none    EBL:  minimal    Complications:  none    Indication/Procedure:  The patient was met in the preoperative holding area. The operative site was marked. The patient was brought back to the OR by the anesthesia team. General anesthesia was induced. Perioperative antibiotics were given    The operative extremity was prepped and draped in the standard fashion. A multidisciplinary time out was performed and the correct patient, site and procedure were confirmed.     We began by exploring the traumatic wound. EPL was completely lacerated at the level of the MP joint. The proximal end was not identified in the wound so we made an incision at Jarek's tubercle EPL was delivered into the proximal wound and then passed distally into the traumatic wound. With the MP in extension, the ends could be brought together. The MP joint was then pinned in extension. The tendon was repaired with 3-0 ethibond. We then took the thumb and wrist through a range of motion. The repair held well and there was no gapping.     The wounds were thoroughly irrigated with saline. Skin was closed with 4-0 nylon. He was placed into a thumb spica splint with the wrist and thumb in extension.     The patient was awoken from anesthesia and taken to recovery in stable condition.     Post Operative Plan:  Return to clinic in 2 weeks for transition to thumb spica cast   Hydrocodone given for pos-top pain relief  XIN Rai M.D.

## 2020-09-11 NOTE — OR NURSING
1504  RECEIVED PATIENT FROM OR.  REPORT FROM DR. GUTIERREZ.  NO AIRWAY IN PLACE.  RESPIRATIONS ARE EVEN AND UNLABORED.  CAST AND ACE WRAP TO LEFT WRIST AND HAND ARE CDI.  ARM ELEVATED ON 2 PILLOWS AND ICE BAG APPLIED.  FINGERS ARE WARM TO TOUCH.    1533  MEDICATED WITH IV FENTANYL FOR C/O LEFT WRIST PAIN 2.    1534  MEDICATED WITH PO OXYCODONE.     1600  PHASE 2.  REPORT TO NICHOLE VIDES.

## 2020-09-11 NOTE — OR NURSING
1603 Pt to phase II from PACU. Report from PEEWEE Uribe. Pt ambulated to recliner w/o difficulty. Respirations even and unlabored. VSS. Declines pain, wife brought to bs. L arm splint CDI. Fingers pink/warm, cap refill < 3 sec.    1615 Tolerating liquid.    1635 Discharge orders received. Meets discharge criteria at this time. Tolerable pain. No nausea. Tolerating PO. On RA. All lines and monitors discontinued. Reviewed discharge paperwork with pt and wife. Discussed diet, activity, medications, follow up care and worsening symptoms. No questions at this time. Pt to be discharged to home via private vehicle. Pt escorted out of department in wheelchair with CNA, wife, and all belongings.

## 2020-09-11 NOTE — ANESTHESIA PREPROCEDURE EVALUATION
Relevant Problems   CARDIAC   (+) Essential hypertension       Physical Exam    Airway   Mallampati: III  TM distance: >3 FB  Neck ROM: full       Cardiovascular - normal exam  Rhythm: regular  Rate: normal  (-) murmur     Dental - normal exam           Pulmonary - normal exam  Breath sounds clear to auscultation     Abdominal    Neurological - normal exam                 Anesthesia Plan    ASA 2       Plan - general       Airway plan will be LMA        Induction: intravenous    Postoperative Plan: Postoperative administration of opioids is intended.    Pertinent diagnostic labs and testing reviewed    Informed Consent:    Anesthetic plan and risks discussed with patient.    Use of blood products discussed with: patient whom consented to blood products.

## 2020-09-11 NOTE — ANESTHESIA PROCEDURE NOTES
Airway    Date/Time: 9/11/2020 2:17 PM  Performed by: Tristan Olivas M.D.  Authorized by: Tristan Olivas M.D.     Location:  OR  Urgency:  Elective  Indications for Airway Management:  Anesthesia      Spontaneous Ventilation: absent    Sedation Level:  Deep  Preoxygenated: Yes    Final Airway Type:  Supraglottic airway  Final Supraglottic Airway:  Standard LMA    SGA Size:  4  Number of Attempts at Approach:  1

## 2020-09-30 DIAGNOSIS — E78.2 MIXED HYPERLIPIDEMIA: ICD-10-CM

## 2020-09-30 DIAGNOSIS — I10 ESSENTIAL HYPERTENSION: ICD-10-CM

## 2020-09-30 RX ORDER — LISINOPRIL 10 MG/1
10 TABLET ORAL DAILY
Qty: 90 TAB | Refills: 0 | Status: SHIPPED | OUTPATIENT
Start: 2020-09-30 | End: 2021-01-04 | Stop reason: SDUPTHER

## 2020-09-30 RX ORDER — ATORVASTATIN CALCIUM 10 MG/1
10 TABLET, FILM COATED ORAL DAILY
Qty: 90 TAB | Refills: 0 | Status: SHIPPED | OUTPATIENT
Start: 2020-09-30 | End: 2021-01-04 | Stop reason: SDUPTHER

## 2020-11-16 ENCOUNTER — PATIENT MESSAGE (OUTPATIENT)
Dept: MEDICAL GROUP | Facility: PHYSICIAN GROUP | Age: 70
End: 2020-11-16

## 2020-11-16 DIAGNOSIS — L71.9 ROSACEA, ACNE: ICD-10-CM

## 2020-11-17 RX ORDER — METRONIDAZOLE 7.5 MG/G
1 GEL TOPICAL DAILY
Qty: 45 G | Refills: 2 | Status: SHIPPED | OUTPATIENT
Start: 2020-11-17 | End: 2021-09-30 | Stop reason: SDUPTHER

## 2020-11-17 NOTE — TELEPHONE ENCOUNTER
From: Gosia Tejada  To: ZULEYMA Varela  Sent: 11/16/2020 5:09 PM PST  Subject: Prescription Question    I need to refill my Metrogel prescription, and it is not available through the refill page. Please send it to the [place] [facility].    Thank you.

## 2020-12-30 ENCOUNTER — PATIENT MESSAGE (OUTPATIENT)
Dept: MEDICAL GROUP | Facility: PHYSICIAN GROUP | Age: 70
End: 2020-12-30

## 2020-12-30 DIAGNOSIS — E78.2 MIXED HYPERLIPIDEMIA: ICD-10-CM

## 2020-12-30 DIAGNOSIS — I10 ESSENTIAL HYPERTENSION: ICD-10-CM

## 2021-01-04 NOTE — TELEPHONE ENCOUNTER
From: Gosia Tejada  To: ZULEYMA Varela  Sent: 12/30/2020 1:51 PM PST  Subject: Prescription Question    Please provide 90-day refills on my lisinopril and atorvastatin through the [facility].    Thank you.

## 2021-01-05 RX ORDER — ATORVASTATIN CALCIUM 10 MG/1
10 TABLET, FILM COATED ORAL DAILY
Qty: 90 TAB | Refills: 0 | Status: SHIPPED | OUTPATIENT
Start: 2021-01-05 | End: 2021-04-05 | Stop reason: SDUPTHER

## 2021-01-05 RX ORDER — LISINOPRIL 10 MG/1
10 TABLET ORAL DAILY
Qty: 90 TAB | Refills: 0 | Status: SHIPPED | OUTPATIENT
Start: 2021-01-05 | End: 2021-04-05 | Stop reason: SDUPTHER

## 2021-01-15 DIAGNOSIS — Z23 NEED FOR VACCINATION: ICD-10-CM

## 2021-04-05 DIAGNOSIS — I10 ESSENTIAL HYPERTENSION: ICD-10-CM

## 2021-04-05 DIAGNOSIS — E78.2 MIXED HYPERLIPIDEMIA: ICD-10-CM

## 2021-04-06 RX ORDER — LISINOPRIL 10 MG/1
10 TABLET ORAL DAILY
Qty: 90 TABLET | Refills: 0 | Status: SHIPPED | OUTPATIENT
Start: 2021-04-06 | End: 2021-06-24 | Stop reason: SDUPTHER

## 2021-04-06 RX ORDER — ATORVASTATIN CALCIUM 10 MG/1
10 TABLET, FILM COATED ORAL DAILY
Qty: 90 TABLET | Refills: 0 | Status: SHIPPED | OUTPATIENT
Start: 2021-04-06 | End: 2021-06-24 | Stop reason: SDUPTHER

## 2021-06-24 ENCOUNTER — OFFICE VISIT (OUTPATIENT)
Dept: MEDICAL GROUP | Facility: PHYSICIAN GROUP | Age: 71
End: 2021-06-24
Payer: MEDICARE

## 2021-06-24 VITALS
HEART RATE: 66 BPM | TEMPERATURE: 97.8 F | HEIGHT: 71 IN | RESPIRATION RATE: 12 BRPM | OXYGEN SATURATION: 97 % | BODY MASS INDEX: 29.26 KG/M2 | WEIGHT: 209 LBS | DIASTOLIC BLOOD PRESSURE: 70 MMHG | SYSTOLIC BLOOD PRESSURE: 124 MMHG

## 2021-06-24 DIAGNOSIS — B00.9 HERPES: ICD-10-CM

## 2021-06-24 DIAGNOSIS — Z12.5 SCREENING FOR PROSTATE CANCER: ICD-10-CM

## 2021-06-24 DIAGNOSIS — Z12.11 SCREEN FOR COLON CANCER: ICD-10-CM

## 2021-06-24 DIAGNOSIS — E78.2 MIXED HYPERLIPIDEMIA: ICD-10-CM

## 2021-06-24 DIAGNOSIS — I10 ESSENTIAL HYPERTENSION: ICD-10-CM

## 2021-06-24 PROCEDURE — 99214 OFFICE O/P EST MOD 30 MIN: CPT | Performed by: NURSE PRACTITIONER

## 2021-06-24 RX ORDER — CHLORHEXIDINE GLUCONATE ORAL RINSE 1.2 MG/ML
SOLUTION DENTAL
COMMUNITY
Start: 2021-05-24 | End: 2021-09-21

## 2021-06-24 RX ORDER — ATORVASTATIN CALCIUM 10 MG/1
10 TABLET, FILM COATED ORAL DAILY
Qty: 90 TABLET | Refills: 0 | Status: SHIPPED | OUTPATIENT
Start: 2021-06-24 | End: 2021-08-10 | Stop reason: SDUPTHER

## 2021-06-24 RX ORDER — AMOXICILLIN 500 MG/1
500 CAPSULE ORAL 3 TIMES DAILY
COMMUNITY
Start: 2021-05-24 | End: 2022-10-21

## 2021-06-24 RX ORDER — LISINOPRIL 10 MG/1
10 TABLET ORAL DAILY
Qty: 90 TABLET | Refills: 0 | Status: SHIPPED | OUTPATIENT
Start: 2021-06-24 | End: 2021-08-10 | Stop reason: SDUPTHER

## 2021-06-24 ASSESSMENT — FIBROSIS 4 INDEX: FIB4 SCORE: 1.7

## 2021-06-24 ASSESSMENT — PATIENT HEALTH QUESTIONNAIRE - PHQ9: CLINICAL INTERPRETATION OF PHQ2 SCORE: 0

## 2021-06-24 NOTE — ASSESSMENT & PLAN NOTE
Chronic problem.  This is well controlled on lisinopril 10 mg.  BP today is 124/70 with a pulse of 66.  Patient denies CP, dyspnea, dizziness, peripheral edema.  Last CMP from 2020 WNL.  Eating refills today.

## 2021-06-24 NOTE — ASSESSMENT & PLAN NOTE
Chronic problem.  Last lipid panel from July 2020.  Currently on atorvastatin 10 mg, tolerating well.  Needing refills today.  Patient is never smoker.  Denies CP, dyspnea, dizziness or peripheral edema.

## 2021-06-24 NOTE — ASSESSMENT & PLAN NOTE
This is chronic in nature.  Flareups frequency once every couple of months, identified triggers are stress and heat.  This is well controlled on Zovirax ointment and Valtrex p.o.  Last hepatic function from July 2020 WNL.

## 2021-06-24 NOTE — PROGRESS NOTES
Chief Complaint   Patient presents with   • Medication Refill     annual        HISTORY OF PRESENT ILLNESS: Patient is a 71 y.o. male, established patient who presents today to discuss medical problems as listed below:    Health Maintenance:  COMPLETED     Essential hypertension  Chronic problem.  This is well controlled on lisinopril 10 mg.  BP today is 124/70 with a pulse of 66.  Patient denies CP, dyspnea, dizziness, peripheral edema.  Last CMP from 2020 WNL.  Eating refills today.    Mixed hyperlipidemia  Chronic problem.  Last lipid panel from July 2020.  Currently on atorvastatin 10 mg, tolerating well.  Needing refills today.  Patient is never smoker.  Denies CP, dyspnea, dizziness or peripheral edema.    Herpes  This is chronic in nature.  Flareups frequency once every couple of months, identified triggers are stress and heat.  This is well controlled on Zovirax ointment and Valtrex p.o.  Last hepatic function from July 2020 WNL.      Patient Active Problem List    Diagnosis Date Noted   • WBC decreased 07/14/2020   • Preoperative clearance 08/20/2019   • Prediabetes 07/30/2019   • OA (osteoarthritis) 07/30/2019   • Essential hypertension 07/22/2019   • Mixed hyperlipidemia 07/22/2019   • Herpes 07/22/2019   • Rosacea, acne 07/22/2019        Allergies: Nkda [no known drug allergy]    Current Outpatient Medications   Medication Sig Dispense Refill   • amoxicillin (AMOXIL) 500 MG Cap      • chlorhexidine (PERIDEX) 0.12 % Solution      • lisinopril (PRINIVIL) 10 MG Tab Take 1 tablet by mouth every day. 90 tablet 0   • atorvastatin (LIPITOR) 10 MG Tab Take 1 tablet by mouth every day. 90 tablet 0   • metronidazole (METROGEL) 0.75 % gel Apply 1 Application topically every day. 45 g 2   • valacyclovir (VALTREX) 1 GM Tab Take 1 Tab by mouth 2 times a day. (Patient taking differently: Take 1,000 mg by mouth 2 times a day as needed.) 90 Tab 0   • acyclovir (ZOVIRAX) 5 % Ointment Apply 1 Application to affected  area(s) every 3 hours as needed. 1 Tube 3     No current facility-administered medications for this visit.       Social History     Tobacco Use   • Smoking status: Never Smoker   • Smokeless tobacco: Never Used   Vaping Use   • Vaping Use: Never used   Substance Use Topics   • Alcohol use: Yes     Alcohol/week: 1.2 oz     Types: 2 Cans of beer per week     Comment: 2 per week   • Drug use: No     Social History     Social History Narrative   • Not on file       Family History   Adopted: Yes       Allergies, past medical history, past surgical history, family history, social history reviewed and updated.    Review of Systems:     - Constitutional: Negative for fever, chills, unexpected weight change, and fatigue/generalized weakness.     - HEENT: Negative for headaches, vision changes, hearing changes, ear pain, ear discharge, rhinorrhea, sinus congestion, sore throat, and neck pain.      - Respiratory: Negative for cough, sputum production, chest congestion, dyspnea, wheezing, and crackles.      - Cardiovascular: Negative for chest pain, palpitations, orthopnea, and bilateral lower extremity edema.     - Gastrointestinal: Negative for heartburn, nausea, vomiting, abdominal pain, hematochezia, melena, diarrhea, constipation, and greasy/foul-smelling stools.     - Genitourinary: Negative for dysuria, polyuria, hematuria, pyuria, urinary urgency, and urinary incontinence.    - Musculoskeletal: Negative for myalgias, back pain, and joint pain.     - Skin: Negative for rash, itching, cyanotic skin color change.     - Neurological: Negative for dizziness, tingling, tremors, focal sensory deficit, focal weakness and headaches.     - Endo/Heme/Allergies: Does not bruise/bleed easily.     - Psychiatric/Behavioral: Negative for depression, suicidal/homicidal ideation and memory loss.      All other systems reviewed and are negative    Exam:    /70   Pulse 66   Temp 36.6 °C (97.8 °F) (Temporal)   Resp 12   Ht 1.803 m  "(5' 11\")   Wt 94.8 kg (209 lb)   SpO2 97%   BMI 29.15 kg/m²  Body mass index is 29.15 kg/m².    Physical Exam:  Constitutional: Well-developed and well-nourished. Not diaphoretic. No distress.   Skin: Skin is warm and dry. No rash noted.  Head: Atraumatic without lesions.  Eyes: Conjunctivae and extraocular motions are normal. Pupils are equal, round, and reactive to light. No scleral icterus.   Ears:  External ears unremarkable. Tympanic membranes clear and intact.  Nose: Nares patent. Septum midline. Turbinates without erythema nor edema. No discharge.   Mouth/Throat: Dentition is normal. Tongue normal. Oropharynx is clear and moist. Posterior pharynx without erythema or exudates.  Neck: Supple, trachea midline. Normal range of motion. No thyromegaly present. No lymphadenopathy--cervical or supraclavicular.  Cardiovascular: Regular rate and rhythm, S1 and S2 without murmur, rubs, or gallops.    Chest: Effort normal. Clear to auscultation throughout. No adventitious sounds. No CVA tenderness.  Abdomen: Soft, non tender, and without distention. Active bowel sounds in all four quadrants. No rebound, guarding, masses or HSM.  : Negative for dysuria, polyuria, hematuria, pyuria, urinary urgency, and urinary incontinence.  Extremities: No cyanosis, clubbing, erythema, nor edema. Distal pulses intact and symmetric.   Musculoskeletal: All major joints AROM full in all directions without pain.  Neurological: Alert and oriented x 3. DTRs 2+/3 and symmetric. No cranial nerve deficit. 5/5 myotomes. Sensation intact. Negative Rhomberg.  Psychiatric:  Behavior, mood, and affect are appropriate.  MA/nursing note and vitals reviewed.    LABS: 2020 results reviewed and discussed with the patient, questions answered.    Assessment/Plan:  1. Essential hypertension  Stable on current regimen.  Continue.  Refills given  - CBC WITHOUT DIFFERENTIAL; Future  - Comp Metabolic Panel; Future  - lisinopril (PRINIVIL) 10 MG Tab; Take 1 " tablet by mouth every day.  Dispense: 90 tablet; Refill: 0    2. Mixed hyperlipidemia  We will recheck labs and discuss findings with patient.  - Lipid Profile; Future  - atorvastatin (LIPITOR) 10 MG Tab; Take 1 tablet by mouth every day.  Dispense: 90 tablet; Refill: 0    3. Screening for prostate cancer  - PROSTATE SPECIFIC AG SCREENING; Future    4. Screen for colon cancer  - COLOGUARD (FIT DNA)    5. Herpes  Stable on current regimen.  Continue.  Will check hepatic function and labs and discuss findings with patient.       Discussed with patient possible alternative diagnoses, pt is to take all medications as prescribed. If symptoms persist FU w/PCP, if symptoms worsen go to emergency room. If experiencing any side effects from prescribed medications reports to the office immediately or go to emergency room.  Reviewed indication, dosage, usage and potential adverse effects of prescribed medications. Reviewed risks and benefits of treatment plan. Patient verbalizes understanding of all instruction and verbally agrees to plan.    No follow-ups on file. annual

## 2021-07-15 ENCOUNTER — TELEPHONE (OUTPATIENT)
Dept: MEDICAL GROUP | Facility: PHYSICIAN GROUP | Age: 71
End: 2021-07-15

## 2021-07-15 NOTE — TELEPHONE ENCOUNTER
Phone Number Called: [phone] (home)       Call outcome: Spoke to patient regarding message below.    Message: Called to inform patient that her cologuard was negative

## 2021-08-09 ENCOUNTER — HOSPITAL ENCOUNTER (OUTPATIENT)
Dept: LAB | Facility: MEDICAL CENTER | Age: 71
End: 2021-08-09
Attending: NURSE PRACTITIONER
Payer: MEDICARE

## 2021-08-09 DIAGNOSIS — Z12.5 SCREENING FOR PROSTATE CANCER: ICD-10-CM

## 2021-08-09 DIAGNOSIS — E78.2 MIXED HYPERLIPIDEMIA: ICD-10-CM

## 2021-08-09 DIAGNOSIS — I10 ESSENTIAL HYPERTENSION: ICD-10-CM

## 2021-08-09 LAB
ALBUMIN SERPL BCP-MCNC: 4.1 G/DL (ref 3.2–4.9)
ALBUMIN/GLOB SERPL: 1.5 G/DL
ALP SERPL-CCNC: 37 U/L (ref 30–99)
ALT SERPL-CCNC: 25 U/L (ref 2–50)
ANION GAP SERPL CALC-SCNC: 14 MMOL/L (ref 7–16)
AST SERPL-CCNC: 27 U/L (ref 12–45)
BILIRUB SERPL-MCNC: 0.7 MG/DL (ref 0.1–1.5)
BUN SERPL-MCNC: 15 MG/DL (ref 8–22)
CALCIUM SERPL-MCNC: 9.2 MG/DL (ref 8.5–10.5)
CHLORIDE SERPL-SCNC: 105 MMOL/L (ref 96–112)
CHOLEST SERPL-MCNC: 157 MG/DL (ref 100–199)
CO2 SERPL-SCNC: 23 MMOL/L (ref 20–33)
CREAT SERPL-MCNC: 0.89 MG/DL (ref 0.5–1.4)
ERYTHROCYTE [DISTWIDTH] IN BLOOD BY AUTOMATED COUNT: 40.3 FL (ref 35.9–50)
FASTING STATUS PATIENT QL REPORTED: NORMAL
GLOBULIN SER CALC-MCNC: 2.7 G/DL (ref 1.9–3.5)
GLUCOSE SERPL-MCNC: 91 MG/DL (ref 65–99)
HCT VFR BLD AUTO: 45 % (ref 42–52)
HDLC SERPL-MCNC: 37 MG/DL
HGB BLD-MCNC: 14.8 G/DL (ref 14–18)
LDLC SERPL CALC-MCNC: 99 MG/DL
MCH RBC QN AUTO: 28.6 PG (ref 27–33)
MCHC RBC AUTO-ENTMCNC: 32.9 G/DL (ref 33.7–35.3)
MCV RBC AUTO: 86.9 FL (ref 81.4–97.8)
PLATELET # BLD AUTO: 208 K/UL (ref 164–446)
PMV BLD AUTO: 10.5 FL (ref 9–12.9)
POTASSIUM SERPL-SCNC: 4.1 MMOL/L (ref 3.6–5.5)
PROT SERPL-MCNC: 6.8 G/DL (ref 6–8.2)
PSA SERPL-MCNC: 0.55 NG/ML (ref 0–4)
RBC # BLD AUTO: 5.18 M/UL (ref 4.7–6.1)
SODIUM SERPL-SCNC: 142 MMOL/L (ref 135–145)
TRIGL SERPL-MCNC: 107 MG/DL (ref 0–149)
WBC # BLD AUTO: 3.9 K/UL (ref 4.8–10.8)

## 2021-08-09 PROCEDURE — 84153 ASSAY OF PSA TOTAL: CPT | Mod: GA

## 2021-08-09 PROCEDURE — 80061 LIPID PANEL: CPT

## 2021-08-09 PROCEDURE — 85027 COMPLETE CBC AUTOMATED: CPT

## 2021-08-09 PROCEDURE — 80053 COMPREHEN METABOLIC PANEL: CPT

## 2021-08-09 PROCEDURE — 36415 COLL VENOUS BLD VENIPUNCTURE: CPT | Mod: GA

## 2021-08-10 ENCOUNTER — TELEMEDICINE (OUTPATIENT)
Dept: MEDICAL GROUP | Facility: PHYSICIAN GROUP | Age: 71
End: 2021-08-10
Payer: MEDICARE

## 2021-08-10 VITALS — WEIGHT: 203 LBS | BODY MASS INDEX: 28.31 KG/M2

## 2021-08-10 DIAGNOSIS — E78.2 MIXED HYPERLIPIDEMIA: ICD-10-CM

## 2021-08-10 DIAGNOSIS — Z12.5 SCREENING FOR PROSTATE CANCER: ICD-10-CM

## 2021-08-10 DIAGNOSIS — B00.9 HERPES: ICD-10-CM

## 2021-08-10 DIAGNOSIS — I10 ESSENTIAL HYPERTENSION: ICD-10-CM

## 2021-08-10 PROCEDURE — 99214 OFFICE O/P EST MOD 30 MIN: CPT | Mod: 95 | Performed by: NURSE PRACTITIONER

## 2021-08-10 RX ORDER — LISINOPRIL 10 MG/1
10 TABLET ORAL DAILY
Qty: 90 TABLET | Refills: 3 | Status: SHIPPED | OUTPATIENT
Start: 2021-08-10 | End: 2021-09-30 | Stop reason: SDUPTHER

## 2021-08-10 RX ORDER — VALACYCLOVIR HYDROCHLORIDE 1 G/1
1000 TABLET, FILM COATED ORAL 2 TIMES DAILY PRN
Qty: 60 TABLET | Refills: 5 | Status: SHIPPED | OUTPATIENT
Start: 2021-08-10 | End: 2022-09-30

## 2021-08-10 RX ORDER — ATORVASTATIN CALCIUM 10 MG/1
10 TABLET, FILM COATED ORAL DAILY
Qty: 90 TABLET | Refills: 3 | Status: SHIPPED | OUTPATIENT
Start: 2021-08-10 | End: 2022-07-28 | Stop reason: SDUPTHER

## 2021-08-10 ASSESSMENT — FIBROSIS 4 INDEX: FIB4 SCORE: 1.87

## 2021-08-10 NOTE — ASSESSMENT & PLAN NOTE
Chronic problem.  This is well controlled on lisinopril 10 mg.  Needing refills today.  BPs are well controlled, in 120s over 70s.  Denies CP, dyspnea, dizziness or peripheral edema.  Recent CMP WNL.

## 2021-08-10 NOTE — ASSESSMENT & PLAN NOTE
Results for RAMON STACK (MRN [id number]) as of 8/10/2021 10:10   Ref. Range 8/9/2021 07:58   Cholesterol,Tot Latest Ref Range: 100 - 199 mg/dL 157   Triglycerides Latest Ref Range: 0 - 149 mg/dL 107   HDL Latest Ref Range: >=40 mg/dL 37 (A)   LDL Latest Ref Range: <100 mg/dL 99   On atorvastatin 10 mg, tolerating well.  Denies CP, dyspnea, dizziness, HA or peripheral edema.  Prior smoker.

## 2021-08-10 NOTE — ASSESSMENT & PLAN NOTE
Chronic problem.  Flareups every couple of months, no triggers or stressors sun exposure.  Symptoms are limited to lip sores.  On valacyclovir 1000 mg twice daily during symptomology.  Hepatic panel is WNL.  Needing refills today.

## 2021-08-10 NOTE — PROGRESS NOTES
Telemedicine Visit: Established Patient     This encounter was conducted via Zoom.   Verbal consent was obtained. Patient's identity was verified.    Subjective:     Chief Complaint   Patient presents with   • Lab Results   • Medication Refill     Ramon Stack is a 71 y.o. male presenting for evaluation and management of following problems:    Herpes  Chronic problem.  Flareups every couple of months, no triggers or stressors sun exposure.  Symptoms are limited to lip sores.  On valacyclovir 1000 mg twice daily during symptomology.  Hepatic panel is WNL.  Needing refills today.    Mixed hyperlipidemia  Results for RAMON STACK (MRN [id number]) as of 8/10/2021 10:10   Ref. Range 8/9/2021 07:58   Cholesterol,Tot Latest Ref Range: 100 - 199 mg/dL 157   Triglycerides Latest Ref Range: 0 - 149 mg/dL 107   HDL Latest Ref Range: >=40 mg/dL 37 (A)   LDL Latest Ref Range: <100 mg/dL 99   On atorvastatin 10 mg, tolerating well.  Denies CP, dyspnea, dizziness, HA or peripheral edema.  Prior smoker.    Essential hypertension  Chronic problem.  This is well controlled on lisinopril 10 mg.  Needing refills today.  BPs are well controlled, in 120s over 70s.  Denies CP, dyspnea, dizziness or peripheral edema.  Recent CMP WNL.      ROS   Denies any recent fevers or chills. No nausea or vomiting. No chest pains or shortness of breath.     Allergies   Allergen Reactions   • Nkda [No Known Drug Allergy]        Current medicines (including changes today)  Current Outpatient Medications   Medication Sig Dispense Refill   • valacyclovir (VALTREX) 1 GM Tab Take 1 tablet by mouth 2 times a day as needed. 60 tablet 5   • atorvastatin (LIPITOR) 10 MG Tab Take 1 tablet by mouth every day. 90 tablet 3   • lisinopril (PRINIVIL) 10 MG Tab Take 1 tablet by mouth every day. 90 tablet 3   • amoxicillin (AMOXIL) 500 MG Cap      • chlorhexidine (PERIDEX) 0.12 % Solution      • metronidazole (METROGEL) 0.75 % gel Apply 1 Application  topically every day. 45 g 2   • acyclovir (ZOVIRAX) 5 % Ointment Apply 1 Application to affected area(s) every 3 hours as needed. 1 Tube 3     No current facility-administered medications for this visit.       Patient Active Problem List    Diagnosis Date Noted   • WBC decreased 07/14/2020   • Preoperative clearance 08/20/2019   • Prediabetes 07/30/2019   • OA (osteoarthritis) 07/30/2019   • Essential hypertension 07/22/2019   • Mixed hyperlipidemia 07/22/2019   • Herpes 07/22/2019   • Rosacea, acne 07/22/2019       Family History   Adopted: Yes       He  has a past medical history of Arthritis, Dental disorder (07/2020), Heart burn, High cholesterol, Hyperlipemia, Hypertension, Rosacea, and Seasonal allergies.  He  has a past surgical history that includes other (1954); other; joint injection diagnostic (9/12/2013); joint injection diagnostic (2/20/2014); hip arthroplasty total (11/20/2014); tonsillectomy; other orthopedic surgery; hip arthroplasty total (Right, 2019); and extensor tendon repair (Left, 9/11/2020).       Objective:   Vitals obtained by patient:  Wt 92.1 kg (203 lb)   BMI 28.31 kg/m²      Physical Exam:  General: No acute distress. Well nourished.   HEENT: EOM grossly intact, no scleral icterus, no pharyngeal erythema.   Neck:  No JVD noted at 90 degrees, trachea midline  CVS: Pulse as reported by patient, no visible LE edema.  Resp: Unlabored respiratory effort, no cough or audible wheeze  MSK/Ext: No clubbing or cyanosis visible appreciated.  Skin: No rashes in visible areas.  Neurological: AOx3. CN III-XII grossly intact. No focal deficits.     LABS: 8/2021  results reviewed and discussed with the patient, questions answered.    My total time spent caring for the patient on the day of the encounter was 15 minutes.   This does not include time spent on separately billable procedures/tests.   Assessment and Plan:   1. Herpes  Stable on current regimen.  Continue.  Refills given.  - valacyclovir  (VALTREX) 1 GM Tab; Take 1 tablet by mouth 2 times a day as needed.  Dispense: 60 tablet; Refill: 5    2. Mixed hyperlipidemia  Stable on current regimen.  Continue.  Refills given  - atorvastatin (LIPITOR) 10 MG Tab; Take 1 tablet by mouth every day.  Dispense: 90 tablet; Refill: 3  - Comp Metabolic Panel; Future  - Lipid Profile; Future    3. Essential hypertension  Stable on current regimen.  Continue.  Refills given  - lisinopril (PRINIVIL) 10 MG Tab; Take 1 tablet by mouth every day.  Dispense: 90 tablet; Refill: 3  - CBC WITHOUT DIFFERENTIAL; Future  - Comp Metabolic Panel; Future    4. Screening for prostate cancer  - PROSTATE SPECIFIC AG SCREENING; Future       Follow-up: No follow-ups on file.

## 2021-09-21 ENCOUNTER — TELEMEDICINE (OUTPATIENT)
Dept: MEDICAL GROUP | Facility: PHYSICIAN GROUP | Age: 71
End: 2021-09-21
Payer: MEDICARE

## 2021-09-21 VITALS — WEIGHT: 205 LBS | BODY MASS INDEX: 28.7 KG/M2 | HEIGHT: 71 IN | RESPIRATION RATE: 12 BRPM

## 2021-09-21 DIAGNOSIS — E78.2 MIXED HYPERLIPIDEMIA: ICD-10-CM

## 2021-09-21 DIAGNOSIS — I10 ESSENTIAL HYPERTENSION: ICD-10-CM

## 2021-09-21 PROCEDURE — G0439 PPPS, SUBSEQ VISIT: HCPCS | Mod: 95 | Performed by: NURSE PRACTITIONER

## 2021-09-21 ASSESSMENT — ACTIVITIES OF DAILY LIVING (ADL): BATHING_REQUIRES_ASSISTANCE: 0

## 2021-09-21 ASSESSMENT — PATIENT HEALTH QUESTIONNAIRE - PHQ9: CLINICAL INTERPRETATION OF PHQ2 SCORE: 0

## 2021-09-21 ASSESSMENT — FIBROSIS 4 INDEX: FIB4 SCORE: 1.87

## 2021-09-21 ASSESSMENT — ENCOUNTER SYMPTOMS: GENERAL WELL-BEING: GOOD

## 2021-09-21 NOTE — PROGRESS NOTES
Chief Complaint   Patient presents with   • Medicare Annual Wellness         HPI:  Gosia is a 71 y.o. here for Medicare Annual Wellness Visit      Patient Active Problem List    Diagnosis Date Noted   • WBC decreased 07/14/2020   • Preoperative clearance 08/20/2019   • Prediabetes 07/30/2019   • OA (osteoarthritis) 07/30/2019   • Essential hypertension 07/22/2019   • Mixed hyperlipidemia 07/22/2019   • Herpes 07/22/2019   • Rosacea, acne 07/22/2019       Current Outpatient Medications   Medication Sig Dispense Refill   • valacyclovir (VALTREX) 1 GM Tab Take 1 tablet by mouth 2 times a day as needed. 60 tablet 5   • atorvastatin (LIPITOR) 10 MG Tab Take 1 tablet by mouth every day. 90 tablet 3   • lisinopril (PRINIVIL) 10 MG Tab Take 1 tablet by mouth every day. 90 tablet 3   • amoxicillin (AMOXIL) 500 MG Cap      • metronidazole (METROGEL) 0.75 % gel Apply 1 Application topically every day. 45 g 2   • acyclovir (ZOVIRAX) 5 % Ointment Apply 1 Application to affected area(s) every 3 hours as needed. 1 Tube 3   • chlorhexidine (PERIDEX) 0.12 % Solution  (Patient not taking: Reported on 9/21/2021)       No current facility-administered medications for this visit.        Patient is taking medications as noted in medication list.  Current supplements as per medication list.     Allergies: Nkda [no known drug allergy]    Current social contact/activities: support system include wife and family in [facility]. Pt enjoys hot dog's nights, Aaron owners, patient enjoys reading. Enjoys walking the dog.    Is patient current with immunizations? Yes.    He  reports that he has never smoked. He has never used smokeless tobacco. He reports current alcohol use of about 1.2 oz of alcohol per week. He reports that he does not use drugs.  Counseling given: Not Answered        DPA/Advanced directive: Patient has Advanced Directive on file.     ROS:    Gait: Uses no assistive device   Ostomy: No  Other tubes: No   Amputations: No   Chronic  oxygen use No   Last eye exam 2 mos ago, done annually  Wears hearing aids: No   : Denies any urinary leakage during the last 6 months    Screening:    Depression Screening    Little interest or pleasure in doing things?  0 - not at all  Feeling down, depressed, or hopeless? 0 - not at all  Patient Health Questionnaire Score: 0    If depressive symptoms identified deferred to follow up visit unless specifically addressed in assessment and plan.    Interpretation of PHQ-9 Total Score   Score Severity   1-4 No Depression   5-9 Mild Depression   10-14 Moderate Depression   15-19 Moderately Severe Depression   20-27 Severe Depression    Screening for Cognitive Impairment    Three Minute Recall (captain, garden, picture)  3/3    Ashwin clock face with all 12 numbers and set the hands to show 5 past 8.  Yes    If cognitive concerns identified, deferred for follow up unless specifically addressed in assessment and plan.    Fall Risk Assessment    Has the patient had two or more falls in the last year or any fall with injury in the last year?  No  If fall risk identified, deferred for follow up unless specifically addressed in assessment and plan.    Safety Assessment    Throw rugs on floor.  No  Handrails on all stairs.  Yes  Good lighting in all hallways.  Yes  Difficulty hearing.  No  Patient counseled about all safety risks that were identified.    Functional Assessment ADLs    Are there any barriers preventing you from cooking for yourself or meeting nutritional needs?  No.    Are there any barriers preventing you from driving safely or obtaining transportation?  No.    Are there any barriers preventing you from using a telephone or calling for help?  No.    Are there any barriers preventing you from shopping?  No.    Are there any barriers preventing you from taking care of your own finances?  No.    Are there any barriers preventing you from managing your medications?  No.    Are there any barriers preventing you  from showering, bathing or dressing yourself?  No.    Are you currently engaging in any exercise or physical activity?  Yes.     What is your perception of your health?  Good.    Health Maintenance Summary                Annual Wellness Visit Overdue 7/30/2020      Done 7/30/2019      Patient has more history with this topic...    IMM INFLUENZA Overdue 9/1/2021      Done 10/1/2020 Imm Admin: Influenza Vaccine Adult HD     Patient has more history with this topic...    COLORECTAL CANCER SCREENING Next Due 8/27/2027     IMM DTaP/Tdap/Td Vaccine Next Due 6/23/2030      Done 6/23/2020 Imm Admin: Tdap Vaccine          Patient Care Team:  ZULEYMA Varela as PCP - General (Family Medicine)    Social History     Tobacco Use   • Smoking status: Never Smoker   • Smokeless tobacco: Never Used   Vaping Use   • Vaping Use: Never used   Substance Use Topics   • Alcohol use: Yes     Alcohol/week: 1.2 oz     Types: 2 Cans of beer per week     Comment: 2 per week   • Drug use: No     Family History   Adopted: Yes     He  has a past medical history of Arthritis, Dental disorder (07/2020), Heart burn, High cholesterol, Hyperlipemia, Hypertension, Rosacea, and Seasonal allergies.   Past Surgical History:   Procedure Laterality Date   • EXTENSOR TENDON REPAIR Left 9/11/2020    Procedure: REPAIR, TENDON, EXTENSOR-THUMB POLLICIS LONGUS VERSUS;  Surgeon: Kathleen Rai M.D.;  Location: SURGERY SAME DAY [facility];  Service: Orthopedics   • HIP ARTHROPLASTY TOTAL Right 2019   • HIP ARTHROPLASTY TOTAL  11/20/2014    Left; Performed by Lucien Fierro M.D. at SURGERY [facility]   • JOINT INJECTION DIAGNOSTIC  2/20/2014    Performed by Lucien Fierro M.D. at SURGERY [facility]   • JOINT INJECTION DIAGNOSTIC  9/12/2013    Performed by Lucien Feirro M.D. at SURGERY [facility]   • OTHER  1954    TONSILLECTOMY   • OTHER      VASECTOMY   • OTHER ORTHOPEDIC SURGERY     • TONSILLECTOMY             Exam:     Resp 12    "Ht 1.803 m (5' 11\")   Wt 93 kg (205 lb)  Body mass index is 28.59 kg/m².    Hearing good.    Dentition good  Alert, oriented in no acute distress.  Eye contact is good, speech goal directed, affect calm    Assessment and Plan. The following treatment and monitoring plan is recommended:    No diagnosis found.      Services suggested: No services needed at this time  Health Care Screening recommendations as per orders if indicated.  Referrals offered: PT/OT/Nutrition counseling/Behavioral Health/Smoking cessation as per orders if indicated.    Discussion today about general wellness and lifestyle habits:    · Prevent falls and reduce trip hazards; Cautioned about securing or removing rugs.  · Have a working fire alarm and carbon monoxide detector;   · Engage in regular physical activity and social activities       Follow-up: No follow-ups on file.   This evaluation was conducted via Zoom using secure and encrypted videoconferencing technology. The patient was in a private location in the [facility].    The patient's identity was confirmed and verbal consent was obtained for this virtual visit.  "

## 2021-09-30 DIAGNOSIS — L71.9 ROSACEA, ACNE: ICD-10-CM

## 2021-09-30 DIAGNOSIS — I10 ESSENTIAL HYPERTENSION: ICD-10-CM

## 2021-10-01 RX ORDER — METRONIDAZOLE 7.5 MG/G
1 GEL TOPICAL DAILY
Qty: 45 G | Refills: 2 | Status: SHIPPED | OUTPATIENT
Start: 2021-10-01 | End: 2022-12-01 | Stop reason: SDUPTHER

## 2021-10-01 RX ORDER — LISINOPRIL 10 MG/1
10 TABLET ORAL DAILY
Qty: 90 TABLET | Refills: 3 | Status: SHIPPED | OUTPATIENT
Start: 2021-10-01 | End: 2022-07-28 | Stop reason: SDUPTHER

## 2021-10-08 ENCOUNTER — OFFICE VISIT (OUTPATIENT)
Dept: MEDICAL GROUP | Facility: PHYSICIAN GROUP | Age: 71
End: 2021-10-08
Payer: MEDICARE

## 2021-10-08 VITALS
OXYGEN SATURATION: 98 % | DIASTOLIC BLOOD PRESSURE: 82 MMHG | BODY MASS INDEX: 29.82 KG/M2 | HEART RATE: 60 BPM | SYSTOLIC BLOOD PRESSURE: 120 MMHG | RESPIRATION RATE: 16 BRPM | HEIGHT: 71 IN | WEIGHT: 213 LBS | TEMPERATURE: 97.2 F

## 2021-10-08 DIAGNOSIS — Z02.9 ADMINISTRATIVE ENCOUNTER: ICD-10-CM

## 2021-10-08 DIAGNOSIS — Z23 NEED FOR VACCINATION: ICD-10-CM

## 2021-10-08 PROCEDURE — 90662 IIV NO PRSV INCREASED AG IM: CPT | Performed by: NURSE PRACTITIONER

## 2021-10-08 PROCEDURE — 99213 OFFICE O/P EST LOW 20 MIN: CPT | Mod: 25 | Performed by: NURSE PRACTITIONER

## 2021-10-08 PROCEDURE — G0008 ADMIN INFLUENZA VIRUS VAC: HCPCS | Performed by: NURSE PRACTITIONER

## 2021-10-08 ASSESSMENT — FIBROSIS 4 INDEX: FIB4 SCORE: 1.87

## 2021-10-08 ASSESSMENT — PAIN SCALES - GENERAL: PAINLEVEL: NO PAIN

## 2021-10-08 NOTE — ASSESSMENT & PLAN NOTE
Patient is here for paperwork, diabetes education clearance paperwork.  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  Patient has not had any issues.  History of hypertension and dyslipidemia, well controlled.  No respiratory issues.  Vitals today are WNL.  Pulse ox today is 98% on room air, BP is 120/82 with a pulse of 60.  He denies any changes in his health, no changes in vision, hearing or MS range of motion.  Paperwork filled out, scanned to media and given to patient.

## 2021-10-08 NOTE — PROGRESS NOTES
Chief Complaint   Patient presents with   • Paperwork     Diving consent form        HISTORY OF PRESENT ILLNESS: Patient is a 71 y.o. male, established patient who presents today to discuss medical problems as listed below:    Health Maintenance:  COMPLETED     Administrative encounter  Patient is here for paperwork, diabetes education clearance paperwork.  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  Patient has not had any issues.  History of hypertension and dyslipidemia, well controlled.  No respiratory issues.  Vitals today are WNL.  Pulse ox today is 98% on room air, BP is 120/82 with a pulse of 60.  He denies any changes in his health, no changes in vision, hearing or MS range of motion.  Paperwork filled out, scanned to media and given to patient.      Patient Active Problem List    Diagnosis Date Noted   • Administrative encounter 10/08/2021   • WBC decreased 07/14/2020   • Preoperative clearance 08/20/2019   • Prediabetes 07/30/2019   • OA (osteoarthritis) 07/30/2019   • Essential hypertension 07/22/2019   • Mixed hyperlipidemia 07/22/2019   • Herpes 07/22/2019   • Rosacea, acne 07/22/2019        Allergies: Nkda [no known drug allergy]    Current Outpatient Medications   Medication Sig Dispense Refill   • metronidazole (METROGEL) 0.75 % gel Apply 1 Application topically every day. 45 g 2   • lisinopril (PRINIVIL) 10 MG Tab Take 1 Tablet by mouth every day. 90 Tablet 3   • valacyclovir (VALTREX) 1 GM Tab Take 1 tablet by mouth 2 times a day as needed. 60 tablet 5   • atorvastatin (LIPITOR) 10 MG Tab Take 1 tablet by mouth every day. 90 tablet 3   • amoxicillin (AMOXIL) 500 MG Cap      • acyclovir (ZOVIRAX) 5 % Ointment Apply 1 Application to affected area(s) every 3 hours as needed. 1 Tube 3     No current facility-administered medications for this visit.       Social History     Tobacco Use   • Smoking status: Never Smoker   • Smokeless tobacco: Never Used   Vaping Use   • Vaping Use:  "Never used   Substance Use Topics   • Alcohol use: Yes     Alcohol/week: 1.2 oz     Types: 2 Cans of beer per week     Comment: 2 per week   • Drug use: No     Social History     Social History Narrative   • Not on file       Family History   Adopted: Yes       Allergies, past medical history, past surgical history, family history, social history reviewed and updated.    Review of Systems:     - Constitutional: Negative for fever, chills, unexpected weight change, and fatigue/generalized weakness.     - HEENT: Negative for headaches, vision changes, hearing changes, ear pain, ear discharge, rhinorrhea, sinus congestion, sore throat, and neck pain.      - Respiratory: Negative for cough, sputum production, chest congestion, dyspnea, wheezing, and crackles.      - Cardiovascular: Negative for chest pain, palpitations, orthopnea, and bilateral lower extremity edema.     - Psychiatric/Behavioral: Negative for depression, suicidal/homicidal ideation and memory loss.      All other systems reviewed and are negative    Exam:    /82   Pulse 60   Temp 36.2 °C (97.2 °F)   Resp 16   Ht 1.803 m (5' 11\")   Wt 96.6 kg (213 lb)   SpO2 98%   BMI 29.71 kg/m²  Body mass index is 29.71 kg/m².    Physical Exam:  Constitutional: Well-developed and well-nourished. Not diaphoretic. No distress.   Ears:  External ears unremarkable. Tympanic membranes clear and intact.  Nose: Nares patent. Septum midline. Turbinates without erythema nor edema. No discharge.   Cardiovascular: Regular rate and rhythm, S1 and S2 without murmur, rubs, or gallops.    Chest: Effort normal. Clear to auscultation throughout. No adventitious sounds.   Extremities: No cyanosis, clubbing, erythema, nor edema. Distal pulses intact and symmetric.   Musculoskeletal: All major joints AROM full in all directions without pain.  Neurological: Alert and oriented x 3.   Psychiatric:  Behavior, mood, and affect are appropriate.  MA/nursing note and vitals " reviewed.    LABS: 8/2021  results reviewed and discussed with the patient, questions answered.    My total time spent caring for the patient on the day of the encounter was 25 minutes.   This does not include time spent on separately billable procedures/tests.     Assessment/Plan:  1. Need for vaccination  - Influenza Vaccine, High Dose (65+ Only)    2. Administrative encounter  Paperwork filled out, scanned into media and given to patient.       Discussed with patient possible alternative diagnoses, pt is to take all medications as prescribed. If symptoms persist FU w/PCP, if symptoms worsen go to emergency room. If experiencing any side effects from prescribed medications reports to the office immediately or go to emergency room.  Reviewed indication, dosage, usage and potential adverse effects of prescribed medications. Reviewed risks and benefits of treatment plan. Patient verbalizes understanding of all instruction and verbally agrees to plan.    No follow-ups on file.

## 2022-07-26 ENCOUNTER — HOSPITAL ENCOUNTER (OUTPATIENT)
Dept: LAB | Facility: MEDICAL CENTER | Age: 72
End: 2022-07-26
Attending: NURSE PRACTITIONER
Payer: MEDICARE

## 2022-07-26 DIAGNOSIS — E78.2 MIXED HYPERLIPIDEMIA: ICD-10-CM

## 2022-07-26 DIAGNOSIS — Z12.5 SCREENING FOR PROSTATE CANCER: ICD-10-CM

## 2022-07-26 DIAGNOSIS — I10 ESSENTIAL HYPERTENSION: ICD-10-CM

## 2022-07-26 LAB
ALBUMIN SERPL BCP-MCNC: 4 G/DL (ref 3.2–4.9)
ALBUMIN/GLOB SERPL: 1.6 G/DL
ALP SERPL-CCNC: 35 U/L (ref 30–99)
ALT SERPL-CCNC: 22 U/L (ref 2–50)
ANION GAP SERPL CALC-SCNC: 10 MMOL/L (ref 7–16)
AST SERPL-CCNC: 26 U/L (ref 12–45)
BILIRUB SERPL-MCNC: 0.8 MG/DL (ref 0.1–1.5)
BUN SERPL-MCNC: 14 MG/DL (ref 8–22)
CALCIUM SERPL-MCNC: 9.1 MG/DL (ref 8.5–10.5)
CHLORIDE SERPL-SCNC: 105 MMOL/L (ref 96–112)
CHOLEST SERPL-MCNC: 169 MG/DL (ref 100–199)
CO2 SERPL-SCNC: 23 MMOL/L (ref 20–33)
CREAT SERPL-MCNC: 0.92 MG/DL (ref 0.5–1.4)
FASTING STATUS PATIENT QL REPORTED: NORMAL
GFR SERPLBLD CREATININE-BSD FMLA CKD-EPI: 88 ML/MIN/1.73 M 2
GLOBULIN SER CALC-MCNC: 2.5 G/DL (ref 1.9–3.5)
GLUCOSE SERPL-MCNC: 95 MG/DL (ref 65–99)
HDLC SERPL-MCNC: 36 MG/DL
LDLC SERPL CALC-MCNC: 108 MG/DL
POTASSIUM SERPL-SCNC: 3.9 MMOL/L (ref 3.6–5.5)
PROT SERPL-MCNC: 6.5 G/DL (ref 6–8.2)
SODIUM SERPL-SCNC: 138 MMOL/L (ref 135–145)
TRIGL SERPL-MCNC: 125 MG/DL (ref 0–149)

## 2022-07-26 PROCEDURE — 36415 COLL VENOUS BLD VENIPUNCTURE: CPT

## 2022-07-26 PROCEDURE — 80061 LIPID PANEL: CPT

## 2022-07-26 PROCEDURE — 80053 COMPREHEN METABOLIC PANEL: CPT

## 2022-07-28 ENCOUNTER — OFFICE VISIT (OUTPATIENT)
Dept: MEDICAL GROUP | Facility: PHYSICIAN GROUP | Age: 72
End: 2022-07-28
Payer: MEDICARE

## 2022-07-28 VITALS
HEART RATE: 70 BPM | HEIGHT: 71 IN | WEIGHT: 213.6 LBS | TEMPERATURE: 97.8 F | BODY MASS INDEX: 29.9 KG/M2 | SYSTOLIC BLOOD PRESSURE: 126 MMHG | DIASTOLIC BLOOD PRESSURE: 80 MMHG | RESPIRATION RATE: 16 BRPM | OXYGEN SATURATION: 96 %

## 2022-07-28 DIAGNOSIS — Z12.5 SCREENING FOR PROSTATE CANCER: ICD-10-CM

## 2022-07-28 DIAGNOSIS — I10 ESSENTIAL HYPERTENSION: ICD-10-CM

## 2022-07-28 DIAGNOSIS — Z12.11 SCREEN FOR COLON CANCER: ICD-10-CM

## 2022-07-28 DIAGNOSIS — E78.2 MIXED HYPERLIPIDEMIA: ICD-10-CM

## 2022-07-28 PROCEDURE — 99214 OFFICE O/P EST MOD 30 MIN: CPT | Performed by: NURSE PRACTITIONER

## 2022-07-28 RX ORDER — IBUPROFEN 800 MG/1
800 TABLET ORAL 2 TIMES DAILY
COMMUNITY
Start: 2022-07-22

## 2022-07-28 RX ORDER — LISINOPRIL 10 MG/1
10 TABLET ORAL DAILY
Qty: 90 TABLET | Refills: 3 | Status: SHIPPED | OUTPATIENT
Start: 2022-07-28 | End: 2022-10-28 | Stop reason: SDUPTHER

## 2022-07-28 RX ORDER — ATORVASTATIN CALCIUM 10 MG/1
10 TABLET, FILM COATED ORAL DAILY
Qty: 90 TABLET | Refills: 3 | Status: SHIPPED | OUTPATIENT
Start: 2022-07-28 | End: 2022-10-28 | Stop reason: SDUPTHER

## 2022-07-28 RX ORDER — CHLORHEXIDINE GLUCONATE ORAL RINSE 1.2 MG/ML
15 SOLUTION DENTAL 2 TIMES DAILY
COMMUNITY
Start: 2022-07-22 | End: 2023-10-17

## 2022-07-28 ASSESSMENT — PATIENT HEALTH QUESTIONNAIRE - PHQ9: CLINICAL INTERPRETATION OF PHQ2 SCORE: 0

## 2022-07-28 ASSESSMENT — FIBROSIS 4 INDEX: FIB4 SCORE: 1.95

## 2022-07-28 NOTE — PROGRESS NOTES
Chief Complaint   Patient presents with   • Annual Exam   • Medication Refill       HISTORY OF PRESENT ILLNESS: Patient is a 72 y.o. male, established patient who presents today to discuss medical problems as listed below:    Health Maintenance:  COMPLETED     Mixed hyperlipidemia   Latest Reference Range & Units 07/26/22 08:36   Cholesterol,Tot 100 - 199 mg/dL 169   Triglycerides 0 - 149 mg/dL 125   HDL >=40 mg/dL 36 !   LDL <100 mg/dL 108 (H)   !: Data is abnormal  (H): Data is abnormally high    The 10-year ASCVD risk score (Kris PATRICK Jr., et al., 2013) is: 25.9%    Values used to calculate the score:      Age: 72 years      Sex: Male      Is Non- : No      Diabetic: No      Tobacco smoker: No      Systolic Blood Pressure: 126 mmHg      Is BP treated: Yes      HDL Cholesterol: 36 mg/dL      Total Cholesterol: 169 mg/dL     On Lipitor 10 mg, tolerating well. Taking CoQ10.    Essential hypertension  Chronic and stable. On Lisinopril. BP today 126/80. No CP, dyspnea, peripheral edema. CMP WNL      Patient Active Problem List    Diagnosis Date Noted   • Administrative encounter 10/08/2021   • WBC decreased 07/14/2020   • Preoperative clearance 08/20/2019   • Prediabetes 07/30/2019   • OA (osteoarthritis) 07/30/2019   • Essential hypertension 07/22/2019   • Mixed hyperlipidemia 07/22/2019   • Herpes 07/22/2019   • Rosacea, acne 07/22/2019        Allergies: Nkda [no known drug allergy]    Current Outpatient Medications   Medication Sig Dispense Refill   • ibuprofen (MOTRIN) 800 MG Tab Take 800 mg by mouth every 8 hours as needed.     • atorvastatin (LIPITOR) 10 MG Tab Take 1 Tablet by mouth every day. 90 Tablet 3   • lisinopril (PRINIVIL) 10 MG Tab Take 1 Tablet by mouth every day. 90 Tablet 3   • metronidazole (METROGEL) 0.75 % gel Apply 1 Application topically every day. 45 g 2   • valacyclovir (VALTREX) 1 GM Tab Take 1 tablet by mouth 2 times a day as needed. 60 tablet 5   • amoxicillin  (AMOXIL) 500 MG Cap      • acyclovir (ZOVIRAX) 5 % Ointment Apply 1 Application to affected area(s) every 3 hours as needed. 1 Tube 3   • chlorhexidine (PERIDEX) 0.12 % Solution SWISH AND RINSE MOUTH WITH 15 ML (1 CAPFUL) FOR 30 SECONDS IN THE MORNING AND EVENING AFTER TOOTHBRUSHING. EXPECTORATE AFTER RINSING, DO NOT SWALLOW.       No current facility-administered medications for this visit.       Social History     Tobacco Use   • Smoking status: Never Smoker   • Smokeless tobacco: Never Used   Vaping Use   • Vaping Use: Never used   Substance Use Topics   • Alcohol use: Yes     Alcohol/week: 1.2 oz     Types: 2 Cans of beer per week     Comment: 2 per week   • Drug use: No     Social History     Social History Narrative   • Not on file       Family History   Adopted: Yes       Allergies, past medical history, past surgical history, family history, social history reviewed and updated.    Review of Systems:     - Constitutional: Negative for fever, chills, unexpected weight change, and fatigue/generalized weakness.     - HEENT: Negative for headaches, vision changes, hearing changes, ear pain, ear discharge, rhinorrhea, sinus congestion, sore throat, and neck pain.      - Respiratory: Negative for cough, sputum production, chest congestion, dyspnea, wheezing, and crackles.      - Cardiovascular: Negative for chest pain, palpitations, orthopnea, and bilateral lower extremity edema.     - Gastrointestinal: Negative for heartburn, nausea, vomiting, abdominal pain, hematochezia, melena, diarrhea, constipation, and greasy/foul-smelling stools.     - Genitourinary: Negative for dysuria, polyuria, hematuria, pyuria, urinary urgency, and urinary incontinence.    - Musculoskeletal: Negative for myalgias, back pain, and joint pain.     - Skin: Negative for rash, itching, cyanotic skin color change.     - Neurological: Negative for dizziness, tingling, tremors, focal sensory deficit, focal weakness and headaches.     -  "Endo/Heme/Allergies: Does not bruise/bleed easily.     - Psychiatric/Behavioral: Negative for depression, suicidal/homicidal ideation and memory loss.      All other systems reviewed and are negative    Exam:    /80   Pulse 70   Temp 36.6 °C (97.8 °F) (Temporal)   Resp 16   Ht 1.803 m (5' 11\")   Wt 96.9 kg (213 lb 9.6 oz)   SpO2 96%   BMI 29.79 kg/m²  Body mass index is 29.79 kg/m².    Physical Exam:  Constitutional: Well-developed and well-nourished. Not diaphoretic. No distress.   Skin: Skin is warm and dry. No rash noted.  Head: Atraumatic without lesions.  Eyes: Conjunctivae and extraocular motions are normal. Pupils are equal, round, and reactive to light. No scleral icterus.   Ears:  External ears unremarkable. Tympanic membranes clear and intact.  Nose: Nares patent. Septum midline. Turbinates without erythema nor edema. No discharge.   Mouth/Throat: Dentition is normal. Tongue normal. Oropharynx is clear and moist. Posterior pharynx without erythema or exudates.  Neck: Supple, trachea midline. Normal range of motion. No thyromegaly present. No lymphadenopathy--cervical or supraclavicular.  Cardiovascular: Regular rate and rhythm, S1 and S2 without murmur, rubs, or gallops.    Chest: Effort normal. Clear to auscultation throughout. No adventitious sounds. No CVA tenderness.  Abdomen: Soft, non tender, and without distention. Active bowel sounds in all four quadrants. No rebound, guarding, masses or HSM.  : Negative for dysuria, polyuria, hematuria, pyuria, urinary urgency, and urinary incontinence.  Extremities: No cyanosis, clubbing, erythema, nor edema. Distal pulses intact and symmetric.   Musculoskeletal: All major joints AROM full in all directions without pain.  Neurological: Alert and oriented x 3. DTRs 2+/3 and symmetric. No cranial nerve deficit. 5/5 myotomes. Sensation intact. Negative Rhomberg.  Psychiatric:  Behavior, mood, and affect are appropriate.  MA/nursing note and vitals " reviewed.    LABS: 7/2022  results reviewed and discussed with the patient, questions answered.     Assessment/Plan:  1. Screening for prostate cancer  - PSA TOTAL + %FREE; Future  - PROSTATE SPECIFIC AG SCREENING; Future    2. Mixed hyperlipidemia  Stable on current regimen.  Continue.  Refills given  - atorvastatin (LIPITOR) 10 MG Tab; Take 1 Tablet by mouth every day.  Dispense: 90 Tablet; Refill: 3    3. Essential hypertension  Stable on current regimen.  Continue.  Refills given  - lisinopril (PRINIVIL) 10 MG Tab; Take 1 Tablet by mouth every day.  Dispense: 90 Tablet; Refill: 3    4. Screen for colon cancer  - LAUREN (FIT DNA)       Discussed with patient possible alternative diagnoses, pt is to take all medications as prescribed. If symptoms persist FU w/PCP, if symptoms worsen go to emergency room. If experiencing any side effects from prescribed medications reports to the office immediately or go to emergency room.  Reviewed indication, dosage, usage and potential adverse effects of prescribed medications. Reviewed risks and benefits of treatment plan. Patient verbalizes understanding of all instruction and verbally agrees to plan.    No follow-ups on file. annual

## 2022-07-28 NOTE — ASSESSMENT & PLAN NOTE
Latest Reference Range & Units 07/26/22 08:36   Cholesterol,Tot 100 - 199 mg/dL 169   Triglycerides 0 - 149 mg/dL 125   HDL >=40 mg/dL 36 !   LDL <100 mg/dL 108 (H)   !: Data is abnormal  (H): Data is abnormally high    The 10-year ASCVD risk score (Kris PATRICK Jr., et al., 2013) is: 25.9%    Values used to calculate the score:      Age: 72 years      Sex: Male      Is Non- : No      Diabetic: No      Tobacco smoker: No      Systolic Blood Pressure: 126 mmHg      Is BP treated: Yes      HDL Cholesterol: 36 mg/dL      Total Cholesterol: 169 mg/dL     On Lipitor 10 mg, tolerating well. Taking CoQ10.

## 2022-09-30 DIAGNOSIS — B00.9 HERPES: ICD-10-CM

## 2022-09-30 RX ORDER — VALACYCLOVIR HYDROCHLORIDE 1 G/1
TABLET, FILM COATED ORAL
Qty: 60 TABLET | Refills: 0 | Status: SHIPPED | OUTPATIENT
Start: 2022-09-30

## 2022-10-07 ENCOUNTER — HOSPITAL ENCOUNTER (OUTPATIENT)
Dept: LAB | Facility: MEDICAL CENTER | Age: 72
End: 2022-10-07
Attending: NURSE PRACTITIONER
Payer: MEDICARE

## 2022-10-07 DIAGNOSIS — Z12.5 SCREENING FOR PROSTATE CANCER: ICD-10-CM

## 2022-10-07 PROCEDURE — 36415 COLL VENOUS BLD VENIPUNCTURE: CPT

## 2022-10-07 PROCEDURE — 84154 ASSAY OF PSA FREE: CPT

## 2022-10-07 PROCEDURE — 84153 ASSAY OF PSA TOTAL: CPT | Mod: GA

## 2022-10-09 LAB
PSA FREE MFR SERPL: 25 %
PSA FREE SERPL-MCNC: 0.2 NG/ML
PSA SERPL-MCNC: 0.8 NG/ML (ref 0–4)

## 2022-10-11 ENCOUNTER — APPOINTMENT (OUTPATIENT)
Dept: RADIOLOGY | Facility: MEDICAL CENTER | Age: 72
End: 2022-10-11
Attending: EMERGENCY MEDICINE
Payer: MEDICARE

## 2022-10-11 ENCOUNTER — HOSPITAL ENCOUNTER (EMERGENCY)
Facility: MEDICAL CENTER | Age: 72
End: 2022-10-11
Attending: EMERGENCY MEDICINE
Payer: MEDICARE

## 2022-10-11 VITALS
RESPIRATION RATE: 16 BRPM | WEIGHT: 206 LBS | DIASTOLIC BLOOD PRESSURE: 66 MMHG | SYSTOLIC BLOOD PRESSURE: 108 MMHG | HEIGHT: 71 IN | OXYGEN SATURATION: 96 % | HEART RATE: 54 BPM | BODY MASS INDEX: 28.84 KG/M2 | TEMPERATURE: 96.9 F

## 2022-10-11 DIAGNOSIS — S01.01XA LACERATION OF SCALP, INITIAL ENCOUNTER: ICD-10-CM

## 2022-10-11 DIAGNOSIS — S22.088A OTHER CLOSED FRACTURE OF ELEVENTH THORACIC VERTEBRA, INITIAL ENCOUNTER (HCC): ICD-10-CM

## 2022-10-11 LAB
ALBUMIN SERPL BCP-MCNC: 4.2 G/DL (ref 3.2–4.9)
ALBUMIN/GLOB SERPL: 1.4 G/DL
ALP SERPL-CCNC: 38 U/L (ref 30–99)
ALT SERPL-CCNC: 26 U/L (ref 2–50)
ANION GAP SERPL CALC-SCNC: 10 MMOL/L (ref 7–16)
AST SERPL-CCNC: 31 U/L (ref 12–45)
BASOPHILS # BLD AUTO: 0.7 % (ref 0–1.8)
BASOPHILS # BLD: 0.04 K/UL (ref 0–0.12)
BILIRUB SERPL-MCNC: 0.5 MG/DL (ref 0.1–1.5)
BUN SERPL-MCNC: 22 MG/DL (ref 8–22)
CALCIUM SERPL-MCNC: 9.9 MG/DL (ref 8.4–10.2)
CHLORIDE SERPL-SCNC: 106 MMOL/L (ref 96–112)
CO2 SERPL-SCNC: 27 MMOL/L (ref 20–33)
CREAT SERPL-MCNC: 1.35 MG/DL (ref 0.5–1.4)
EOSINOPHIL # BLD AUTO: 0.08 K/UL (ref 0–0.51)
EOSINOPHIL NFR BLD: 1.5 % (ref 0–6.9)
ERYTHROCYTE [DISTWIDTH] IN BLOOD BY AUTOMATED COUNT: 39.6 FL (ref 35.9–50)
GFR SERPLBLD CREATININE-BSD FMLA CKD-EPI: 55 ML/MIN/1.73 M 2
GLOBULIN SER CALC-MCNC: 2.9 G/DL (ref 1.9–3.5)
GLUCOSE SERPL-MCNC: 101 MG/DL (ref 65–99)
HCT VFR BLD AUTO: 43.6 % (ref 42–52)
HGB BLD-MCNC: 14.4 G/DL (ref 14–18)
IMM GRANULOCYTES # BLD AUTO: 0.03 K/UL (ref 0–0.11)
IMM GRANULOCYTES NFR BLD AUTO: 0.6 % (ref 0–0.9)
LYMPHOCYTES # BLD AUTO: 0.85 K/UL (ref 1–4.8)
LYMPHOCYTES NFR BLD: 15.9 % (ref 22–41)
MCH RBC QN AUTO: 28.9 PG (ref 27–33)
MCHC RBC AUTO-ENTMCNC: 33 G/DL (ref 33.7–35.3)
MCV RBC AUTO: 87.4 FL (ref 81.4–97.8)
MONOCYTES # BLD AUTO: 0.48 K/UL (ref 0–0.85)
MONOCYTES NFR BLD AUTO: 9 % (ref 0–13.4)
NEUTROPHILS # BLD AUTO: 3.88 K/UL (ref 1.82–7.42)
NEUTROPHILS NFR BLD: 72.3 % (ref 44–72)
NRBC # BLD AUTO: 0 K/UL
NRBC BLD-RTO: 0 /100 WBC
PLATELET # BLD AUTO: 194 K/UL (ref 164–446)
PMV BLD AUTO: 9.6 FL (ref 9–12.9)
POTASSIUM SERPL-SCNC: 4.6 MMOL/L (ref 3.6–5.5)
PROT SERPL-MCNC: 7.1 G/DL (ref 6–8.2)
RBC # BLD AUTO: 4.99 M/UL (ref 4.7–6.1)
SODIUM SERPL-SCNC: 143 MMOL/L (ref 135–145)
WBC # BLD AUTO: 5.4 K/UL (ref 4.8–10.8)

## 2022-10-11 PROCEDURE — 96374 THER/PROPH/DIAG INJ IV PUSH: CPT | Mod: XU

## 2022-10-11 PROCEDURE — 96375 TX/PRO/DX INJ NEW DRUG ADDON: CPT | Mod: XU

## 2022-10-11 PROCEDURE — 304217 HCHG IRRIGATION SYSTEM

## 2022-10-11 PROCEDURE — 305308 HCHG STAPLER,SKIN,DISP.

## 2022-10-11 PROCEDURE — 700117 HCHG RX CONTRAST REV CODE 255: Performed by: EMERGENCY MEDICINE

## 2022-10-11 PROCEDURE — 36415 COLL VENOUS BLD VENIPUNCTURE: CPT

## 2022-10-11 PROCEDURE — 80053 COMPREHEN METABOLIC PANEL: CPT

## 2022-10-11 PROCEDURE — 700111 HCHG RX REV CODE 636 W/ 250 OVERRIDE (IP): Performed by: EMERGENCY MEDICINE

## 2022-10-11 PROCEDURE — 304999 HCHG REPAIR-SIMPLE/INTERMED LEVEL 1

## 2022-10-11 PROCEDURE — 70450 CT HEAD/BRAIN W/O DYE: CPT

## 2022-10-11 PROCEDURE — 72125 CT NECK SPINE W/O DYE: CPT

## 2022-10-11 PROCEDURE — 72128 CT CHEST SPINE W/O DYE: CPT

## 2022-10-11 PROCEDURE — 71260 CT THORAX DX C+: CPT

## 2022-10-11 PROCEDURE — 700101 HCHG RX REV CODE 250: Performed by: EMERGENCY MEDICINE

## 2022-10-11 PROCEDURE — 99285 EMERGENCY DEPT VISIT HI MDM: CPT

## 2022-10-11 PROCEDURE — 72131 CT LUMBAR SPINE W/O DYE: CPT

## 2022-10-11 PROCEDURE — 85025 COMPLETE CBC W/AUTO DIFF WBC: CPT

## 2022-10-11 RX ORDER — MORPHINE SULFATE 4 MG/ML
4 INJECTION INTRAVENOUS ONCE
Status: COMPLETED | OUTPATIENT
Start: 2022-10-11 | End: 2022-10-11

## 2022-10-11 RX ORDER — KETOROLAC TROMETHAMINE 30 MG/ML
15 INJECTION, SOLUTION INTRAMUSCULAR; INTRAVENOUS ONCE
Status: COMPLETED | OUTPATIENT
Start: 2022-10-11 | End: 2022-10-11

## 2022-10-11 RX ORDER — ACETAMINOPHEN 500 MG
1000 TABLET ORAL EVERY 6 HOURS PRN
Status: SHIPPED | COMMUNITY
End: 2023-05-04

## 2022-10-11 RX ORDER — OXYCODONE HYDROCHLORIDE AND ACETAMINOPHEN 5; 325 MG/1; MG/1
1 TABLET ORAL EVERY 4 HOURS PRN
Qty: 15 TABLET | Refills: 0 | Status: SHIPPED | OUTPATIENT
Start: 2022-10-11 | End: 2022-10-16

## 2022-10-11 RX ORDER — LIDOCAINE HYDROCHLORIDE AND EPINEPHRINE 10; 10 MG/ML; UG/ML
10 INJECTION, SOLUTION INFILTRATION; PERINEURAL ONCE
Status: COMPLETED | OUTPATIENT
Start: 2022-10-11 | End: 2022-10-11

## 2022-10-11 RX ADMIN — KETOROLAC TROMETHAMINE 15 MG: 30 INJECTION, SOLUTION INTRAMUSCULAR; INTRAVENOUS at 12:13

## 2022-10-11 RX ADMIN — MORPHINE SULFATE 4 MG: 4 INJECTION, SOLUTION INTRAMUSCULAR; INTRAVENOUS at 15:17

## 2022-10-11 RX ADMIN — IOHEXOL 100 ML: 350 INJECTION, SOLUTION INTRAVENOUS at 13:16

## 2022-10-11 RX ADMIN — LIDOCAINE HYDROCHLORIDE,EPINEPHRINE BITARTRATE 10 ML: 10; .01 INJECTION, SOLUTION INFILTRATION; PERINEURAL at 14:03

## 2022-10-11 ASSESSMENT — FIBROSIS 4 INDEX: FIB4 SCORE: 1.95

## 2022-10-11 NOTE — ED PROVIDER NOTES
"ED Provider Note    CHIEF COMPLAINT  Chief Complaint   Patient presents with    Fall Greater than 10 feet     Pt states he fell from about 15 ft, pt was up on a ladder sanding a balcony when the ladder fells out from under him; pt fell flt on back, denies any neuro symptoms but c/o \"spasms in my mid back\"     Head Laceration     Back of the head, 2-3 inch laceration present, bandage applied in triage, bleeding controlled at this time    Back Pain     Mid pack, \"feels like its in a tight spasm\"; was able to get up and walk into the house after fall, denies any neck pain or midline tenderness       HPI  Gosia Tejada is a 72 y.o. male who presents stating that he was on a ladder 15 feet up when the ladder slid out and he fell on his back hitting his head, he suffered a head laceration.  He is complaining of back pain and headache.  He had no vomiting, no focal neurologic deficits.  He denies being on any blood thinning medication.  He is not certain when his last tetanus booster was.  He denies neck pain.    REVIEW OF SYSTEMS  See HPI for further details. All other systems are negative.     PAST MEDICAL HISTORY   has a past medical history of Arthritis, Dental disorder (07/2020), Heart burn, High cholesterol, Hyperlipemia, Hypertension, Rosacea, and Seasonal allergies.    SOCIAL HISTORY  Social History     Tobacco Use    Smoking status: Never    Smokeless tobacco: Never   Vaping Use    Vaping Use: Never used   Substance and Sexual Activity    Alcohol use: Yes     Alcohol/week: 1.2 oz     Types: 2 Cans of beer per week     Comment: 2 per week    Drug use: No    Sexual activity: Not Currently     Partners: Female     Comment: ; 31 years        SURGICAL HISTORY   has a past surgical history that includes other (1954); other; joint injection diagnostic (9/12/2013); joint injection diagnostic (2/20/2014); hip arthroplasty total (11/20/2014); tonsillectomy; other orthopedic surgery; hip arthroplasty total " "(Right, 2019); and extensor tendon repair (Left, 9/11/2020).    CURRENT MEDICATIONS  Home Medications       Reviewed by Divya Calixto (Pharmacy Tech) on 10/11/22 at 1310  Med List Status: Complete     Medication Last Dose Status   acetaminophen (TYLENOL) 500 MG Tab > 2 days Active   amoxicillin (AMOXIL) 500 MG Cap 10/10/2022 Active   atorvastatin (LIPITOR) 10 MG Tab 10/10/2022 Active   chlorhexidine (PERIDEX) 0.12 % Solution 10/11/2022 Active   Cholecalciferol (D3 PO) 10/11/2022 Active   Coenzyme Q10 (CO Q 10 PO) 10/11/2022 Active   Glucos-Chondroit-Hyaluron-MSM (GLUCOSAMINE CHONDROITIN JOINT PO) 10/11/2022 Active   ibuprofen (MOTRIN) 800 MG Tab 10/11/2022 Active   lisinopril (PRINIVIL) 10 MG Tab 10/10/2022 Active   metronidazole (METROGEL) 0.75 % gel 10/11/2022 Active   multivitamin (THERAGRAN) Tab 10/11/2022 Active   valacyclovir (VALTREX) 1 GM Tab > 4 days Active                    ALLERGIES  Allergies   Allergen Reactions    Nkda [No Known Drug Allergy]        FAMILY HISTORY  No pertinent family history    PHYSICAL EXAM  VITAL SIGNS: BP (!) 155/87   Pulse 61   Temp (!) 35.8 °C (96.4 °F) (Temporal)   Resp 16   Ht 1.803 m (5' 11\")   Wt 93.4 kg (206 lb)   SpO2 94%   BMI 28.73 kg/m²  @TRE[[id number]::@   Pulse ox interpretation: I interpret this pulse ox as normal.  Constitutional: Alert.  HENT: 5 cm laceration of the occiput.  Eyes: Pupils are equal and reactive, Conjunctiva normal, Non-icteric.   Neck: Normal range of motion, No tenderness, Supple, No stridor.  The patient is placed in a c-collar.  Lymphatic: No lymphadenopathy noted.   Cardiovascular: Regular rate and rhythm, no murmurs.   Thorax & Lungs: Normal breath sounds, No respiratory distress, No wheezing, No chest tenderness.   Abdomen: Bowel sounds normal, Soft, No tenderness, No masses, No pulsatile masses. No peritoneal signs.  Skin: Warm, Dry, No erythema, No rash.   Back: The patient has lumbar and lower thoracic tenderness with no bony " step-off.  Extremities: Intact distal pulses, No edema, No tenderness, No cyanosis.  Musculoskeletal: Good range of motion in all major joints. No tenderness to palpation or major deformities noted.   Neurologic: Alert , Normal motor function, Normal sensory function, No focal deficits noted.  No evidence of spinal cord compression.  Psychiatric: Affect normal, Judgment normal, Mood normal.       DIAGNOSTIC STUDIES / PROCEDURES      RADIOLOGY  CT-CHEST,ABDOMEN,PELVIS WITH   Final Result      1.  Acute T11 vertebral body fracture with approximately 15-20% loss of height.   2.  No visceral injury in the chest, abdomen or pelvis.   3.  Moderate hiatal hernia.      CT-TSPINE W/O PLUS RECONS   Final Result      Acute 2 column T11 fracture with approximately 15-20% loss of height and no significant bony retropulsion.      CT-LSPINE W/O PLUS RECONS   Final Result      1.  Acute T11 fracture as detailed on separate thoracic spine CT report.   2.  No acute fracture or dislocation of the lumbar spine.   3.  Multilevel degenerative changes most pronounced at L4-L5 with mild grade 1 degenerative anterolisthesis and severe spinal stenosis.      CT-HEAD W/O   Final Result      1.  No acute intracranial abnormality.   2.  Small posterior scalp laceration.                  CT-CSPINE WITHOUT PLUS RECONS   Final Result      No acute fracture or dislocation of the cervical spine.        Laceration procedure note:  The patient's scalp laceration was anesthetized with 4 cc 2% lidocaine with epinephrine.  The wound was irrigated with high-pressure normal saline.  I explored the wound, I debrided some skin, the was no evidence of foreign body.  There was no evident skull step-off.  The wound was closed with 6 staples, there were no complications.  Bandage was applied.  The patient did not require tetanus vaccination, he had 1 2 years ago.      COURSE & MEDICAL DECISION MAKING  Pertinent Labs & Imaging studies reviewed. (See chart for  details)    The patient presents after a 15 foot fall hitting his head.  He is over 64, CT scans were ordered to evaluate.  Tetanus booster was ordered.    The patient's CT scan is consistent with acute 2 column T11 fracture with 15 to 20% loss of height with no significant bony retropulsion.    I spoke with the neurosurgeon Dr. Villafana concerning the T11 fracture.  He would like the patient placed in a TLSO brace, if he tolerates this he will be discharged.    The patient is given 4 mg IV morphine.  He was placed in a TLSO brace and is tolerating it.  He will follow-up with Dr. Villafana.  He will return to the ER for any neurologic symptoms.  He will have his staples removed in 10 days.        I reviewed prescription monitoring program for patient's narcotic use before prescribing a scheduled drug.The patient will not drink alcohol nor drive with prescribed medications. The patient will return for new or worsening symptoms and is stable at the time of discharge.    The patient is referred to a primary physician for blood pressure management, diabetic screening, and for all other preventative health concerns.    In prescribing controlled substances to this patient, I certify that I have obtained and reviewed the medical history of Gosia Tejada. I have also made a good colleen effort to obtain applicable records from other providers who have treated the patient and records did not demonstrate any increased risk of substance abuse that would prevent me from prescribing controlled substances.     I have conducted a physical exam and documented it. I have reviewed Mr. Tejada’s prescription history as maintained by the [place] Prescription Monitoring Program.     I have assessed the patient’s risk for abuse, dependency, and addiction using the validated Opioid Risk Tool available at https://www.mdcalc.com/ldmklf-vlur-fwdd-ort-narcotic-abuse.     Given the above, I believe the benefits of controlled substance therapy  outweigh the risks. The reasons for prescribing controlled substances include non-narcotic, oral analgesic alternatives have been inadequate for pain control. Accordingly, I have discussed the risk and benefits, treatment plan, and alternative therapies with the patient.       DISPOSITION:  Patient will be discharged home in stable condition.    FOLLOW UP:  [facility], Emergency Dept  [address]  [place] [id number] [phone]  Follow up  If symptoms worsen    Roger Villafana M.D.  [address]   [place] [id number] [phone]    Follow up  call for follow up appointment      OUTPATIENT MEDICATIONS:  New Prescriptions    OXYCODONE-ACETAMINOPHEN (PERCOCET) 5-325 MG TAB    Take 1 Tablet by mouth every four hours as needed (pain) for up to 5 days.         The patient will not drink alcohol nor drive with prescribed medications. The patient will return for worsening symptoms and is stable at the time of discharge. The patient verbalizes understanding and will comply.    FINAL IMPRESSION  1.  Acute 5 cm scalp laceration with 1 layer ERP staple closure  2.  Acute T11 2 column thoracic spine fracture with 20% loss of height  3.  Acute closed head injury         Electronically signed by: Sumeet Santana M.D., 10/11/2022 12:08 PM

## 2022-10-11 NOTE — ED NOTES
Med rec updated and complete, per pt  Allergies reviewed, per pt  Interviewed pt with wife at bedside with permission from pt.

## 2022-10-11 NOTE — ED NOTES
C-collar in place.  PIV placed in RAC, blood drawn and sent to lab.  Call placed to CT for exams.

## 2022-10-11 NOTE — ED TRIAGE NOTES
"Chief Complaint   Patient presents with    Fall Greater than 10 feet     Pt states he fell from about 15 ft, pt was up on a ladder sanding a balcony when the ladder fells out from under him; pt fell flt on back, denies any neuro symptoms but c/o \"spasms in my mid back\"     Head Laceration     Back of the head, 2-3 inch laceration present, bandage applied in triage, bleeding controlled at this time    Back Pain     Mid pack, \"feels like its in a tight spasm\"; was able to get up and walk into the house after fall, denies any neck pain or midline tenderness     /75   Pulse 64   Temp (!) 35.8 °C (96.4 °F) (Temporal)   Resp 18   Ht 1.803 m (5' 11\")   Wt 93.4 kg (206 lb)   SpO2 94%   BMI 28.73 kg/m²     Pt BIB friend for above, injury occurred about 45 mins PTA, denies any LOC.   "

## 2022-10-11 NOTE — ED NOTES
Patient discharged home in stable condition with wife  AVS provided with recommended follow up and home care instructions and education information  Prescription for percocet electronically provided at this time  Patient and wife verbalized understanding  Ambulatory to wheelchair at time of discharge

## 2022-10-11 NOTE — DISCHARGE INSTRUCTIONS
Staple Wound Closure  Staples are used to help a wound heal faster by holding the edges of the wound together.  HOME CARE  Keep the area around the staples clean and dry.  Rest and raise (elevate) the injured part above the level of your heart.  See your doctor for a follow-up check of the wound.  See your doctor to have the staples removed.  Clean the wound daily with water.  Do not soak the wound in water for long periods of time.  Let air reach the wound as it heals.  GET HELP RIGHT AWAY IF:   You have redness or puffiness around the wound.  You have a red line going away from the wound.  You have more pain or tenderness.  You have yellowish-white fluid (pus) coming from the wound.  Your wound does not stay together after the staples have been taken out.  You see something coming out of the wound, such as wood or glass.  You have problems moving the injured area.  You have a fever or lasting symptoms for more than 2-3 days.  You have a fever and your symptoms suddenly get worse.  MAKE SURE YOU:   Understand these instructions.  Will watch this condition.  Will get help right away if you are not doing well or get worse.  Document Released: 09/26/2009 Document Revised: 09/11/2013 Document Reviewed: 06/30/2013  ExitCare® Patient Information ©2014 [employer].    Thoracic Spine Fracture  A thoracic spine fracture is a break in one of the bones of the middle part of the back. Thoracic spine fractures can vary from mild to severe. The most severe types are those that:  Cause the broken bones to move out of place (unstable).  Injure or press on the spinal cord.  In some cases, the bone that connects to the lower part of the back may also have a break (thoracolumbar fracture).  What are the causes?  This condition may be caused by:  A motor vehicle collision.  A fall or jump from a great height.  A collision during sports.  Violent acts, such as an assault or gunshot.  What increases the risk?  You are more likely to  develop this condition if:  You have osteoporosis.  You play contact sports.  You are in a situation that could result in a fall or other violent injury.  What are the signs or symptoms?  Symptoms of this injury depend on the type of fracture. The main symptom of this condition is back pain that gets worse with movement.  Other symptoms include:  Trouble standing or walking.  Numbness.  Tingling.  Weakness.  Loss of movement.  Loss of bowel or bladder control (incontinence).  How is this diagnosed?  This condition may be diagnosed based on:  Your symptoms and medical history.  A physical exam.  Imaging tests, such as:  X-rays.  CT scan.  MRI.  How is this treated?  Treatment for this injury depends on the type of fracture. If your fracture is stable and does not affect your spinal cord, it may heal with treatments such as:  Medicines for pain.  A cast or a brace.  Physical therapy.  If your fracture is unstable or if it affects your spinal cord, you may need surgery.  Follow these instructions at home:  Medicines  Take over-the-counter and prescription medicines only as told by your health care provider.  Do not drive or use heavy machinery while taking pain medicine.  To prevent or treat constipation while you are taking prescription pain medicine, your health care provider may recommend that you:  Drink enough fluid to keep your urine pale yellow.  Take over-the-counter or prescription medicines.  Eat foods that are high in fiber, such as fresh fruits and vegetables, whole grains, and beans.  Limit foods that are high in fat and processed sugars, such as fried or sweet foods.  If you have a brace:  Wear the brace as told by your health care provider. Remove it only as told by your health care provider.  Keep the brace clean.  If the brace is not waterproof:  Do not let it get wet.  Cover it with a watertight covering when you take a bath or a shower.  Activity  Stay in bed (on bed rest) only as directed by your  health care provider. Being on bed rest for too long can make your condition worse.  Return to your normal activities as directed by your health care provider. Ask what activities are safe for you.  Do exercises to improve motion and strength in your back (physical therapy), as recommended by your health care provider.  Exercise regularly as told by your health care provider.  Managing pain, stiffness, and swelling    If directed, apply ice to the injured area:  Put ice in a plastic bag.  Place a towel between your skin and the bag.  Leave the ice on for 20 minutes, 2-3 times a day.  General instructions  Do not use any products that contain nicotine or tobacco, such as cigarettes and e-cigarettes. These can delay healing after injury. If you need help quitting, ask your health care provider.  Do not drink alcohol. Alcohol can interfere with your treatment.  Keep all follow-up visits as directed by your health care provider. This is important. It can help to prevent permanent injury, disability, and long-lasting (chronic) pain.  Contact a health care provider if:  You have a fever.  You develop a cough that makes your pain worse.  Your pain medicine is not helping.  Your pain does not get better over time.  You cannot return to your normal activities as planned or expected.  Get help right away if:  Your pain is very bad and it suddenly gets worse.  You are unable to move any part of your body (paralysis) that is below the level of your injury.  You have numbness, tingling, or weakness in any part of your body that is below the level of your injury.  You cannot control your bladder or bowels.  Summary  A thoracic spine fracture is a break in one of the bones of the middle part of the back.  Treatment for this injury depends on the type of fracture.  A stable fracture can be treated with a back brace, activity restrictions, pain medicine, and physical therapy. A more severe break may require surgery.  Make sure you  know what symptoms should cause you to get help right away.  This information is not intended to replace advice given to you by your health care provider. Make sure you discuss any questions you have with your health care provider.  Document Released: 01/25/2006 Document Revised: 02/01/2019 Document Reviewed: 02/01/2019  Elsevier Patient Education © 2020 [employer]

## 2022-10-11 NOTE — ED NOTES
Rounding completed  Patient resting quietly on gurney  C-spine precautions remain in place with c-collar  Patient reports improved pain in lower back to 5/10  Reminded patient that he will remain NPO; verbalized understanding  Denies any other needs at this time

## 2022-10-21 ENCOUNTER — OFFICE VISIT (OUTPATIENT)
Dept: URGENT CARE | Facility: CLINIC | Age: 72
End: 2022-10-21
Payer: MEDICARE

## 2022-10-21 VITALS
TEMPERATURE: 97.5 F | HEIGHT: 71 IN | SYSTOLIC BLOOD PRESSURE: 140 MMHG | WEIGHT: 205 LBS | RESPIRATION RATE: 18 BRPM | DIASTOLIC BLOOD PRESSURE: 76 MMHG | BODY MASS INDEX: 28.7 KG/M2 | OXYGEN SATURATION: 96 % | HEART RATE: 86 BPM

## 2022-10-21 DIAGNOSIS — Z48.02 ENCOUNTER FOR STAPLE REMOVAL: ICD-10-CM

## 2022-10-21 PROCEDURE — 99212 OFFICE O/P EST SF 10 MIN: CPT | Performed by: PHYSICIAN ASSISTANT

## 2022-10-21 ASSESSMENT — FIBROSIS 4 INDEX: FIB4 SCORE: 2.29

## 2022-10-21 NOTE — PROGRESS NOTES
Subjective:   Gosia Tejada is a 72 y.o. male who presents for Suture / Staple Removal (6 staples placed on back of scalp at [facility] ED on 10/11)     Patient presents for staple removal.  Seen at [facility] ED on 10/11/2022 with head laceration after fall from 15 feet.  Also sustained T11 fracture.  Reviewed ED notes.  Head CT negative.  Staples placed to posterior occiput.  Denies wound related issues.  No fevers or chills.  Has follow-up with neurosurgery.        Medications:  acetaminophen Tabs  atorvastatin Tabs  chlorhexidine Soln  CO Q 10 PO  D3 PO  GLUCOSAMINE CHONDROITIN JOINT PO  ibuprofen Tabs  lisinopril Tabs  metronidazole  multivitamin Tabs  valacyclovir Tabs    Allergies:             Nkda [no known drug allergy]    Surgical History:         Past Surgical History:   Procedure Laterality Date    EXTENSOR TENDON REPAIR Left 9/11/2020    Procedure: REPAIR, TENDON, EXTENSOR-THUMB POLLICIS LONGUS VERSUS;  Surgeon: Kathleen Rai M.D.;  Location: SURGERY SAME DAY [facility];  Service: Orthopedics    HIP ARTHROPLASTY TOTAL Right 2019    HIP ARTHROPLASTY TOTAL  11/20/2014    Left; Performed by Lucien Fierro M.D. at SURGERY [facility] ORS    JOINT INJECTION DIAGNOSTIC  2/20/2014    Performed by Lucien Fierro M.D. at SURGERY [facility] ORS    JOINT INJECTION DIAGNOSTIC  9/12/2013    Performed by Lucien Fierro M.D. at SURGERY [facility] ORS    OTHER  1955    TONSILLECTOMY    OTHER      VASECTOMY    OTHER ORTHOPEDIC SURGERY      TONSILLECTOMY         Past Social Hx:  Gosia Tejada  reports that he has never smoked. He has never used smokeless tobacco. He reports current alcohol use of about 1.2 oz per week. He reports that he does not use drugs.     Past Family Hx:   Gosia Tejada family history is not on file. He was adopted.       Problem list, medications, and allergies reviewed by myself today in Epic.     Objective:     BP (!) 140/76   Pulse 86   Temp 36.4 °C (97.5 °F)  "(Temporal)   Resp 18   Ht 1.803 m (5' 11\")   Wt 93 kg (205 lb)   SpO2 96%   BMI 28.59 kg/m²     Physical Exam  HENT:      Head:        Comments: Linear laceration well approximated to parieto-occipital region.  Staples in place.  No evidence of infection.  No cellulitis.      Assessment/Plan:     Diagnosis and Associated Orders:     1. Encounter for staple removal      Comments/MDM:    Staples removed without incident.  Wound clean dry and intact.  Wound care discussed.  Follow-up with neurosurgery.    I personally reviewed prior external notes and test results pertinent to today's visit.  Red flags discussed as well as indications to present to the Emergency Department.  Supportive care, natural history, differential diagnoses, and indications for immediate follow-up discussed.  Patient expresses understanding and agrees to plan.  Patient denies any other questions or concerns.    Follow-up with the primary care physician for recheck, reevaluation, and consideration of further management.      Please note that this dictation was created using voice recognition software. I have made a reasonable attempt to correct obvious errors, but I expect that there are errors of grammar and possibly content that I did not discover before finalizing the note.    This note was electronically signed by Brandee Macario PA-C          "

## 2022-11-08 ENCOUNTER — PATIENT MESSAGE (OUTPATIENT)
Dept: HEALTH INFORMATION MANAGEMENT | Facility: OTHER | Age: 72
End: 2022-11-08

## 2022-12-01 ENCOUNTER — OFFICE VISIT (OUTPATIENT)
Dept: MEDICAL GROUP | Facility: PHYSICIAN GROUP | Age: 72
End: 2022-12-01
Payer: MEDICARE

## 2022-12-01 VITALS
RESPIRATION RATE: 16 BRPM | SYSTOLIC BLOOD PRESSURE: 122 MMHG | TEMPERATURE: 97.1 F | BODY MASS INDEX: 27.58 KG/M2 | HEIGHT: 71 IN | HEART RATE: 71 BPM | WEIGHT: 197 LBS | OXYGEN SATURATION: 95 % | DIASTOLIC BLOOD PRESSURE: 84 MMHG

## 2022-12-01 DIAGNOSIS — L71.9 ROSACEA, ACNE: ICD-10-CM

## 2022-12-01 DIAGNOSIS — L71.9 ROSACEA: ICD-10-CM

## 2022-12-01 PROCEDURE — 99214 OFFICE O/P EST MOD 30 MIN: CPT | Performed by: NURSE PRACTITIONER

## 2022-12-01 RX ORDER — METRONIDAZOLE 7.5 MG/G
1 GEL TOPICAL DAILY
Qty: 45 G | Refills: 1 | Status: SHIPPED | OUTPATIENT
Start: 2022-12-01 | End: 2023-05-04 | Stop reason: SDUPTHER

## 2022-12-01 SDOH — HEALTH STABILITY: PHYSICAL HEALTH: ON AVERAGE, HOW MANY MINUTES DO YOU ENGAGE IN EXERCISE AT THIS LEVEL?: 0 MIN

## 2022-12-01 SDOH — HEALTH STABILITY: PHYSICAL HEALTH: ON AVERAGE, HOW MANY DAYS PER WEEK DO YOU ENGAGE IN MODERATE TO STRENUOUS EXERCISE (LIKE A BRISK WALK)?: 0 DAYS

## 2022-12-01 SDOH — ECONOMIC STABILITY: HOUSING INSECURITY
IN THE LAST 12 MONTHS, WAS THERE A TIME WHEN YOU DID NOT HAVE A STEADY PLACE TO SLEEP OR SLEPT IN A SHELTER (INCLUDING NOW)?: NO

## 2022-12-01 SDOH — ECONOMIC STABILITY: HOUSING INSECURITY: IN THE LAST 12 MONTHS, HOW MANY PLACES HAVE YOU LIVED?: 1

## 2022-12-01 SDOH — ECONOMIC STABILITY: INCOME INSECURITY: HOW HARD IS IT FOR YOU TO PAY FOR THE VERY BASICS LIKE FOOD, HOUSING, MEDICAL CARE, AND HEATING?: NOT HARD AT ALL

## 2022-12-01 SDOH — ECONOMIC STABILITY: TRANSPORTATION INSECURITY
IN THE PAST 12 MONTHS, HAS THE LACK OF TRANSPORTATION KEPT YOU FROM MEDICAL APPOINTMENTS OR FROM GETTING MEDICATIONS?: NO

## 2022-12-01 SDOH — ECONOMIC STABILITY: INCOME INSECURITY: IN THE LAST 12 MONTHS, WAS THERE A TIME WHEN YOU WERE NOT ABLE TO PAY THE MORTGAGE OR RENT ON TIME?: NO

## 2022-12-01 SDOH — ECONOMIC STABILITY: FOOD INSECURITY: WITHIN THE PAST 12 MONTHS, YOU WORRIED THAT YOUR FOOD WOULD RUN OUT BEFORE YOU GOT MONEY TO BUY MORE.: NEVER TRUE

## 2022-12-01 SDOH — ECONOMIC STABILITY: FOOD INSECURITY: WITHIN THE PAST 12 MONTHS, THE FOOD YOU BOUGHT JUST DIDN'T LAST AND YOU DIDN'T HAVE MONEY TO GET MORE.: NEVER TRUE

## 2022-12-01 SDOH — ECONOMIC STABILITY: TRANSPORTATION INSECURITY
IN THE PAST 12 MONTHS, HAS LACK OF RELIABLE TRANSPORTATION KEPT YOU FROM MEDICAL APPOINTMENTS, MEETINGS, WORK OR FROM GETTING THINGS NEEDED FOR DAILY LIVING?: NO

## 2022-12-01 SDOH — HEALTH STABILITY: MENTAL HEALTH
STRESS IS WHEN SOMEONE FEELS TENSE, NERVOUS, ANXIOUS, OR CAN'T SLEEP AT NIGHT BECAUSE THEIR MIND IS TROUBLED. HOW STRESSED ARE YOU?: NOT AT ALL

## 2022-12-01 SDOH — ECONOMIC STABILITY: TRANSPORTATION INSECURITY
IN THE PAST 12 MONTHS, HAS LACK OF TRANSPORTATION KEPT YOU FROM MEETINGS, WORK, OR FROM GETTING THINGS NEEDED FOR DAILY LIVING?: NO

## 2022-12-01 ASSESSMENT — LIFESTYLE VARIABLES
AUDIT-C TOTAL SCORE: 2
SKIP TO QUESTIONS 9-10: 1
HOW OFTEN DO YOU HAVE A DRINK CONTAINING ALCOHOL: 2-4 TIMES A MONTH
HOW OFTEN DO YOU HAVE SIX OR MORE DRINKS ON ONE OCCASION: NEVER
HOW MANY STANDARD DRINKS CONTAINING ALCOHOL DO YOU HAVE ON A TYPICAL DAY: 1 OR 2

## 2022-12-01 ASSESSMENT — SOCIAL DETERMINANTS OF HEALTH (SDOH)
HOW OFTEN DO YOU GET TOGETHER WITH FRIENDS OR RELATIVES?: TWICE A WEEK
IN A TYPICAL WEEK, HOW MANY TIMES DO YOU TALK ON THE PHONE WITH FAMILY, FRIENDS, OR NEIGHBORS?: ONCE A WEEK
HOW OFTEN DO YOU ATTEND CHURCH OR RELIGIOUS SERVICES?: NEVER
HOW OFTEN DO YOU ATTENT MEETINGS OF THE CLUB OR ORGANIZATION YOU BELONG TO?: MORE THAN 4 TIMES PER YEAR
HOW OFTEN DO YOU HAVE A DRINK CONTAINING ALCOHOL: 2-4 TIMES A MONTH
HOW OFTEN DO YOU GET TOGETHER WITH FRIENDS OR RELATIVES?: TWICE A WEEK
WITHIN THE PAST 12 MONTHS, YOU WORRIED THAT YOUR FOOD WOULD RUN OUT BEFORE YOU GOT THE MONEY TO BUY MORE: NEVER TRUE
HOW HARD IS IT FOR YOU TO PAY FOR THE VERY BASICS LIKE FOOD, HOUSING, MEDICAL CARE, AND HEATING?: NOT HARD AT ALL
IN A TYPICAL WEEK, HOW MANY TIMES DO YOU TALK ON THE PHONE WITH FAMILY, FRIENDS, OR NEIGHBORS?: ONCE A WEEK
DO YOU BELONG TO ANY CLUBS OR ORGANIZATIONS SUCH AS CHURCH GROUPS UNIONS, FRATERNAL OR ATHLETIC GROUPS, OR SCHOOL GROUPS?: YES
HOW MANY DRINKS CONTAINING ALCOHOL DO YOU HAVE ON A TYPICAL DAY WHEN YOU ARE DRINKING: 1 OR 2
HOW OFTEN DO YOU ATTENT MEETINGS OF THE CLUB OR ORGANIZATION YOU BELONG TO?: MORE THAN 4 TIMES PER YEAR
DO YOU BELONG TO ANY CLUBS OR ORGANIZATIONS SUCH AS CHURCH GROUPS UNIONS, FRATERNAL OR ATHLETIC GROUPS, OR SCHOOL GROUPS?: YES
HOW OFTEN DO YOU HAVE SIX OR MORE DRINKS ON ONE OCCASION: NEVER
HOW OFTEN DO YOU ATTEND CHURCH OR RELIGIOUS SERVICES?: NEVER

## 2022-12-01 ASSESSMENT — FIBROSIS 4 INDEX: FIB4 SCORE: 2.29

## 2022-12-01 NOTE — PROGRESS NOTES
Chief Complaint   Patient presents with    Follow-Up     Medication refill follow up       HISTORY OF PRESENT ILLNESS: Patient is a 72 y.o. male, established patient who presents today to discuss medical problems as listed below:    Health Maintenance:  COMPLETED     Rosacea, acne  Chronic problem. Well controlled with Metronidazole, needing refills today.     Patient Active Problem List    Diagnosis Date Noted    Administrative encounter 10/08/2021    WBC decreased 07/14/2020    Preoperative clearance 08/20/2019    Prediabetes 07/30/2019    OA (osteoarthritis) 07/30/2019    Essential hypertension 07/22/2019    Mixed hyperlipidemia 07/22/2019    Herpes 07/22/2019    Rosacea, acne 07/22/2019        Allergies: Nkda [no known drug allergy]    Current Outpatient Medications   Medication Sig Dispense Refill    metronidazole (METROGEL) 0.75 % gel Apply 1 Application topically every day. Indications: Rosacea 45 g 1    atorvastatin (LIPITOR) 10 MG Tab Take 1 Tablet by mouth every day. 90 Tablet 1    lisinopril (PRINIVIL) 10 MG Tab Take 1 Tablet by mouth every day. 90 Tablet 1    acetaminophen (TYLENOL) 500 MG Tab Take 1,000 mg by mouth every 6 hours as needed for Moderate Pain.      Glucos-Chondroit-Hyaluron-MSM (GLUCOSAMINE CHONDROITIN JOINT PO) Take 2 Tablets by mouth every day.      multivitamin (THERAGRAN) Tab Take 1 Tablet by mouth every day.      Cholecalciferol (D3 PO) Take 1 Capsule by mouth every day.      Coenzyme Q10 (CO Q 10 PO) Take 1 Capsule by mouth every day.      valacyclovir (VALTREX) 1 GM Tab Take 1 tablet by mouth twice daily as needed (Patient taking differently: Take 1,000 mg by mouth 2 times a day as needed (For cold sores).) 60 Tablet 0    chlorhexidine (PERIDEX) 0.12 % Solution Take 15 mL by mouth 2 times a day.      ibuprofen (MOTRIN) 800 MG Tab Take 800 mg by mouth 2 times a day.       No current facility-administered medications for this visit.       Social History     Tobacco Use    Smoking  "status: Never    Smokeless tobacco: Never   Vaping Use    Vaping Use: Never used   Substance Use Topics    Alcohol use: Yes     Alcohol/week: 1.2 oz     Types: 2 Cans of beer per week     Comment: 2 per week    Drug use: No     Social History     Social History Narrative    Not on file       Family History   Adopted: Yes       Allergies, past medical history, past surgical history, family history, social history reviewed and updated.    Review of Systems:     - Constitutional: Negative for fever, chills, unexpected weight change, and fatigue/generalized weakness.     - Respiratory: Negative for cough, sputum production, chest congestion, dyspnea, wheezing, and crackles.      - Cardiovascular: Negative for chest pain, palpitations, orthopnea, and bilateral lower extremity edema.      - Psychiatric/Behavioral: Negative for depression, suicidal/homicidal ideation and memory loss.      All other systems reviewed and are negative    Exam:    /84 (BP Location: Right arm, Patient Position: Sitting, BP Cuff Size: Adult)   Pulse 71   Temp 36.2 °C (97.1 °F) (Temporal)   Resp 16   Ht 1.803 m (5' 11\")   Wt 89.4 kg (197 lb)   SpO2 95%   BMI 27.48 kg/m²  Body mass index is 27.48 kg/m².    Physical Exam:  Constitutional: Well-developed and well-nourished. Not diaphoretic. No distress.   Cardiovascular: Regular rate and rhythm, S1 and S2 without murmur, rubs, or gallops.    Chest: Effort normal. Clear to auscultation throughout. No adventitious sounds. Neurological: Alert and oriented x 3.   Psychiatric:  Behavior, mood, and affect are appropriate.  MA/nursing note and vitals reviewed.    Assessment/Plan:  1. Rosacea, acne  Stable on current regimen.  Continue.  Refills given   - metronidazole (METROGEL) 0.75 % gel; Apply 1 Application topically every day. Indications: Rosacea  Dispense: 45 g; Refill: 1    2. Rosacea, acne  Stable on current regimen.  Continue.  Refills given        Discussed with patient possible " alternative diagnoses, patient is to take all medications as prescribed.      If symptoms persist FU w/PCP, if symptoms worsen go to emergency room.      If experiencing any side effects from prescribed medications report to the office immediately or go to emergency room.     Reviewed indication, dosage, usage and potential adverse effects of prescribed medications.      Reviewed risks and benefits of treatment plan. Patient verbalizes understanding of all instruction and verbally agrees to plan.     Discussed plan with the patient, and patient agrees to the above.      I personally reviewed prior external notes and test results pertinent to today's visit.      No follow-ups on file. biannual

## 2022-12-06 ENCOUNTER — OFFICE VISIT (OUTPATIENT)
Dept: MEDICAL GROUP | Facility: MEDICAL CENTER | Age: 72
End: 2022-12-06
Payer: MEDICARE

## 2022-12-06 VITALS
WEIGHT: 205.14 LBS | OXYGEN SATURATION: 98 % | HEART RATE: 59 BPM | SYSTOLIC BLOOD PRESSURE: 132 MMHG | HEIGHT: 71 IN | TEMPERATURE: 97.5 F | DIASTOLIC BLOOD PRESSURE: 78 MMHG | BODY MASS INDEX: 28.72 KG/M2

## 2022-12-06 DIAGNOSIS — E78.2 MIXED HYPERLIPIDEMIA: ICD-10-CM

## 2022-12-06 DIAGNOSIS — Z00.00 MEDICARE ANNUAL WELLNESS VISIT, SUBSEQUENT: ICD-10-CM

## 2022-12-06 DIAGNOSIS — I10 ESSENTIAL HYPERTENSION: ICD-10-CM

## 2022-12-06 DIAGNOSIS — Z12.5 SCREENING FOR PROSTATE CANCER: ICD-10-CM

## 2022-12-06 PROCEDURE — G0439 PPPS, SUBSEQ VISIT: HCPCS | Performed by: NURSE PRACTITIONER

## 2022-12-06 ASSESSMENT — FIBROSIS 4 INDEX: FIB4 SCORE: 2.29

## 2022-12-06 ASSESSMENT — PATIENT HEALTH QUESTIONNAIRE - PHQ9: CLINICAL INTERPRETATION OF PHQ2 SCORE: 0

## 2022-12-06 ASSESSMENT — ACTIVITIES OF DAILY LIVING (ADL): BATHING_REQUIRES_ASSISTANCE: 0

## 2022-12-06 ASSESSMENT — ENCOUNTER SYMPTOMS: GENERAL WELL-BEING: GOOD

## 2022-12-06 NOTE — PROGRESS NOTES
Chief Complaint   Patient presents with    Annual Wellness Visit   Radiation Therapy Completion  Treatment outcome  Discontinued       HPI:  Gosia Tejada is a 72 y.o. here for Medicare Annual Wellness Visit     Patient Active Problem List    Diagnosis Date Noted    Administrative encounter 10/08/2021    WBC decreased 07/14/2020    Preoperative clearance 08/20/2019    Prediabetes 07/30/2019    OA (osteoarthritis) 07/30/2019    Essential hypertension 07/22/2019    Mixed hyperlipidemia 07/22/2019    Herpes 07/22/2019    Rosacea, acne 07/22/2019       Current Outpatient Medications   Medication Sig Dispense Refill    metronidazole (METROGEL) 0.75 % gel Apply 1 Application topically every day. Indications: Rosacea 45 g 1    atorvastatin (LIPITOR) 10 MG Tab Take 1 Tablet by mouth every day. 90 Tablet 1    lisinopril (PRINIVIL) 10 MG Tab Take 1 Tablet by mouth every day. 90 Tablet 1    acetaminophen (TYLENOL) 500 MG Tab Take 1,000 mg by mouth every 6 hours as needed for Moderate Pain.      Glucos-Chondroit-Hyaluron-MSM (GLUCOSAMINE CHONDROITIN JOINT PO) Take 2 Tablets by mouth every day.      multivitamin (THERAGRAN) Tab Take 1 Tablet by mouth every day.      Cholecalciferol (D3 PO) Take 1 Capsule by mouth every day.      Coenzyme Q10 (CO Q 10 PO) Take 1 Capsule by mouth every day.      valacyclovir (VALTREX) 1 GM Tab Take 1 tablet by mouth twice daily as needed (Patient taking differently: Take 1,000 mg by mouth 2 times a day as needed (For cold sores).) 60 Tablet 0    chlorhexidine (PERIDEX) 0.12 % Solution Take 15 mL by mouth 2 times a day.      ibuprofen (MOTRIN) 800 MG Tab Take 800 mg by mouth 2 times a day.       No current facility-administered medications for this visit.          Current supplements as per medication list.     Allergies: Nkda [no known drug allergy]    Current social contact/activities: pt is , support systems in friends. [facility]. Physical activity includes biking, working  around the house and yard. He enjoys reading, still working as [employer].      He  reports that he has never smoked. He has never used smokeless tobacco. He reports current alcohol use of about 1.2 oz per week. He reports that he does not use drugs.  Counseling given: Not Answered      ROS:    Gait: Uses no assistive device  Ostomy: No  Other tubes: No  Amputations: No  Chronic oxygen use: No  Last eye exam: may 2022  Wears hearing aids: No   : Denies any urinary leakage during the last 6 months    Screening:    Depression Screening  Little interest or pleasure in doing things?   0  Feeling down, depressed , or hopeless?  0  Trouble falling or staying asleep, or sleeping too much?     Feeling tired or having little energy?   0  Poor appetite or overeating?   0  Feeling bad about yourself - or that you are a failure or have let yourself or your family down?  0  Trouble concentrating on things, such as reading the newspaper or watching television?  0  Moving or speaking so slowly that other people could have noticed.  Or the opposite - being so fidgety or restless that you have been moving around a lot more than usual?   0  Thoughts that you would be better off dead, or of hurting yourself?   0  Patient Health Questionnaire Score:  0    If depressive symptoms identified deferred to follow up visit unless specifically addressed in assessment and plan.    Interpretation of PHQ-9 Total Score   Score Severity   1-4 No Depression   5-9 Mild Depression   10-14 Moderate Depression   15-19 Moderately Severe Depression   20-27 Severe Depression    Screening for Cognitive Impairment  Three Minute Recall (daughter, heaven, mountain) 3 /3    Ashwin clock face with all 12 numbers and set the hands to show 10 past 11.     ye  Cognitive concerns identified deferred for follow up unless specifically addressed in assessment and plan.    Fall Risk Assessment  Has the patient had two or more falls in the last year or any fall with  injury in the last year?   no    Safety Assessment  Throw rugs on floor.   yes  Handrails on all stairs.   yes  Good lighting in all hallways.   yes  Difficulty hearing.   no  Patient counseled about all safety risks that were identified.    Functional Assessment ADLs  Are there any barriers preventing you from cooking for yourself or meeting nutritional needs?   .  no  Are there any barriers preventing you from driving safely or obtaining transportation?   .  no  Are there any barriers preventing you from using a telephone or calling for help?     no  Are there any barriers preventing you from shopping?   .  no  Are there any barriers preventing you from taking care of your own finances?     no  Are there any barriers preventing you from managing your medications?    no   Are there any barriers preventing you from showering, bathing or dressing yourself?    no  Are you currently engaging in any exercise or physical activity?   .  no  What is your perception of your health?  good    Advance Care Planning  Do you have an Advance Directive, Living Will, Durable Power of , or POLST?  no                 Health Maintenance Summary            Overdue - COVID-19 Vaccine (1) Overdue - never done      No completion history exists for this topic.              Overdue - IMM INFLUENZA (1) Overdue since 9/1/2022      10/08/2021  Imm Admin: Influenza Vaccine Adult HD    10/01/2020  Imm Admin: Influenza Vaccine Adult HD    09/30/2019  Imm Admin: Influenza Vaccine Adult HD    10/26/2018  Imm Admin: Influenza Vaccine Adult HD    10/13/2017  Imm Admin: Influenza Vaccine Adult HD    Only the first 5 history entries have been loaded, but more history exists.              Overdue - Annual Wellness Visit (Every 366 Days) Overdue since 9/22/2022 09/21/2021  Done    07/30/2019  Done    07/30/2019  Visit Dx: Medicare annual wellness visit, subsequent              Postponed - IMM HEP B VACCINE (1 of 3 - 3-dose series) Postponed  until 2023      No completion history exists for this topic.              Ordered - COLORECTAL CANCER SCREENING (COLONOSCOPY - Every 10 Years) Ordered on 2021  COLOGUARD COLON CANCER SCREENING    2021  COLOGUARD COLON CANCER SCREENING    2017  REFERRAL TO GI FOR COLONOSCOPY    2013  REFERRAL TO GI FOR COLONOSCOPY              IMM DTaP/Tdap/Td Vaccine (2 - Td or Tdap) Next due on 2020  Imm Admin: Tdap Vaccine              IMM PNEUMOCOCCAL VACCINE: 65+ Years (Series Information) Completed      2016  Imm Admin: Pneumococcal Conjugate Vaccine (Prevnar/PCV-13)    2014  Imm Admin: Pneumococcal polysaccharide vaccine (PPSV-23)    2014  Imm Admin: Pneumococcal polysaccharide vaccine (PPSV-23)              HEPATITIS C SCREENING  Completed      2020  HCV Scrn ( [id number] 1xLife)              IMM MENINGOCOCCAL ACWY VACCINE (Series Information) Aged Out      No completion history exists for this topic.              Discontinued - IMM ZOSTER VACCINES  Discontinued      2021  Imm Admin: Zoster Vaccine Recombinant (RZV) (SHINGRIX)                    Patient Care Team:  ZULEYMA Varela as PCP - General (Family Medicine)      Social History     Tobacco Use    Smoking status: Never    Smokeless tobacco: Never   Vaping Use    Vaping Use: Never used   Substance Use Topics    Alcohol use: Yes     Alcohol/week: 1.2 oz     Types: 2 Cans of beer per week     Comment: 2 per week    Drug use: No     Family History   Adopted: Yes     He  has a past medical history of Arthritis, Dental disorder (2020), Heart burn, High cholesterol, Hyperlipemia, Hypertension, Rosacea, and Seasonal allergies.   Past Surgical History:   Procedure Laterality Date    EXTENSOR TENDON REPAIR Left 2020    Procedure: REPAIR, TENDON, EXTENSOR-THUMB POLLICIS LONGUS VERSUS;  Surgeon: Kathleen Rai M.D.;  Location: SURGERY SAME DAY [facility];  Service:  Orthopedics    HIP ARTHROPLASTY TOTAL Right 2019    HIP ARTHROPLASTY TOTAL  11/20/2014    Left; Performed by Lucien Fierro M.D. at SURGERY [facility]    JOINT INJECTION DIAGNOSTIC  2/20/2014    Performed by Lucien Fierro M.D. at SURGERY [facility]    JOINT INJECTION DIAGNOSTIC  9/12/2013    Performed by Lucien Fierro M.D. at SURGERY [facility]    OTHER  1955    TONSILLECTOMY    OTHER      VASECTOMY    OTHER ORTHOPEDIC SURGERY      TONSILLECTOMY         Exam:   There were no vitals taken for this visit. There is no height or weight on file to calculate BMI.    Hearing good.    Dentition good  Alert, oriented in no acute distress.  Eye contact is good, speech goal directed, affect calm    Assessment and Plan. The following treatment and monitoring plan is recommended:    There are no diagnoses linked to this encounter.    Services suggested: No services needed at this time  Health Care Screening: Age-appropriate preventive services recommended by USPTF and ACIP covered by Medicare were discussed today. Services ordered if indicated and agreed upon by the patient.  Referrals offered: Community-based lifestyle interventions to reduce health risks and promote self-management and wellness, fall prevention, nutrition, physical activity, tobacco-use cessation, weight loss, and mental health services as per orders if indicated.    Discussion today about general wellness and lifestyle habits:    Prevent falls and reduce trip hazards; Cautioned about securing or removing rugs.  Have a working fire alarm and carbon monoxide detector;   Engage in regular physical activity and social activities     Follow-up: No follow-ups on file.

## 2023-05-04 ENCOUNTER — OFFICE VISIT (OUTPATIENT)
Dept: MEDICAL GROUP | Facility: MEDICAL CENTER | Age: 73
End: 2023-05-04
Payer: MEDICARE

## 2023-05-04 VITALS
BODY MASS INDEX: 28.78 KG/M2 | RESPIRATION RATE: 16 BRPM | SYSTOLIC BLOOD PRESSURE: 126 MMHG | HEART RATE: 64 BPM | TEMPERATURE: 98.1 F | WEIGHT: 205.58 LBS | HEIGHT: 71 IN | DIASTOLIC BLOOD PRESSURE: 76 MMHG | OXYGEN SATURATION: 98 %

## 2023-05-04 DIAGNOSIS — E78.2 MIXED HYPERLIPIDEMIA: ICD-10-CM

## 2023-05-04 DIAGNOSIS — R94.4 DECREASED GFR: ICD-10-CM

## 2023-05-04 DIAGNOSIS — L71.9 ROSACEA, ACNE: ICD-10-CM

## 2023-05-04 DIAGNOSIS — Z12.5 SCREENING FOR PROSTATE CANCER: ICD-10-CM

## 2023-05-04 DIAGNOSIS — L71.9 ROSACEA: ICD-10-CM

## 2023-05-04 DIAGNOSIS — I10 ESSENTIAL HYPERTENSION: ICD-10-CM

## 2023-05-04 DIAGNOSIS — R73.09 ELEVATED HEMOGLOBIN A1C: ICD-10-CM

## 2023-05-04 PROCEDURE — 99214 OFFICE O/P EST MOD 30 MIN: CPT | Performed by: NURSE PRACTITIONER

## 2023-05-04 RX ORDER — METRONIDAZOLE 7.5 MG/G
1 GEL TOPICAL DAILY
Qty: 45 G | Refills: 1 | Status: SHIPPED | OUTPATIENT
Start: 2023-05-04

## 2023-05-04 RX ORDER — LISINOPRIL 10 MG/1
10 TABLET ORAL DAILY
Qty: 90 TABLET | Refills: 0 | Status: SHIPPED | OUTPATIENT
Start: 2023-07-20 | End: 2023-10-17 | Stop reason: SDUPTHER

## 2023-05-04 RX ORDER — ATORVASTATIN CALCIUM 10 MG/1
10 TABLET, FILM COATED ORAL DAILY
Qty: 90 TABLET | Refills: 0 | Status: SHIPPED | OUTPATIENT
Start: 2023-08-02 | End: 2023-10-17 | Stop reason: SDUPTHER

## 2023-05-04 ASSESSMENT — PATIENT HEALTH QUESTIONNAIRE - PHQ9: CLINICAL INTERPRETATION OF PHQ2 SCORE: 0

## 2023-05-04 ASSESSMENT — FIBROSIS 4 INDEX: FIB4 SCORE: 2.32

## 2023-05-04 NOTE — ASSESSMENT & PLAN NOTE
Latest Reference Range & Units 07/26/22 08:36   Cholesterol,Tot 100 - 199 mg/dL 169   Triglycerides 0 - 149 mg/dL 125   HDL >=40 mg/dL 36 !   LDL <100 mg/dL 108 (H)   !: Data is abnormal  (H): Data is abnormally high  Chronic and stable.  On atorvastatin 10 mg and tolerating well.  Needing refills today.

## 2023-05-04 NOTE — ASSESSMENT & PLAN NOTE
Latest Reference Range & Units 10/11/22 12:05   GFR (CKD-EPI) >60 mL/min/1.73 m 2 55 !   !: Data is abnormal  Chronic and stable.  GFR from October 2022 is slightly decreased at 55.  Patient is taking lisinopril 10 mg for the blood pressure control.  BP stable, 126/76 today.  Denies CP, dyspnea, dizziness or palpitations

## 2023-05-04 NOTE — ASSESSMENT & PLAN NOTE
Latest Reference Range & Units 07/26/22 08:36 10/11/22 12:05   GFR (CKD-EPI) >60 mL/min/1.73 m 2 88 55 !   !: Data is abnormal

## 2023-05-04 NOTE — PROGRESS NOTES
Chief Complaint   Patient presents with    Follow-Up       HISTORY OF PRESENT ILLNESS: Patient is a 73 y.o. male, established patient who presents today to discuss medical problems as listed below:    Health Maintenance:  COMPLETED       Allergies: Nkda [no known drug allergy]    Current Outpatient Medications   Medication Sig Dispense Refill    [START ON 8/2/2023] atorvastatin (LIPITOR) 10 MG Tab Take 1 Tablet by mouth every day. 90 Tablet 0    metronidazole (METROGEL) 0.75 % gel Apply 1 Application. topically every day. Indications: Rosacea 45 g 1    [START ON 7/20/2023] lisinopril (PRINIVIL) 10 MG Tab Take 1 Tablet by mouth every day. 90 Tablet 0    Glucos-Chondroit-Hyaluron-MSM (GLUCOSAMINE CHONDROITIN JOINT PO) Take 2 Tablets by mouth every day.      multivitamin (THERAGRAN) Tab Take 1 Tablet by mouth every day.      Cholecalciferol (D3 PO) Take 1 Capsule by mouth every day.      Coenzyme Q10 (CO Q 10 PO) Take 1 Capsule by mouth every day.      valacyclovir (VALTREX) 1 GM Tab Take 1 tablet by mouth twice daily as needed (Patient taking differently: Take 1,000 mg by mouth 2 times a day as needed (For cold sores).) 60 Tablet 0    chlorhexidine (PERIDEX) 0.12 % Solution Take 15 mL by mouth 2 times a day.      ibuprofen (MOTRIN) 800 MG Tab Take 800 mg by mouth 2 times a day.       No current facility-administered medications for this visit.       Allergies, past medical history, past surgical history, family history, social history reviewed and updated.    Review of Systems:     - Constitutional: Negative for fever, chills, unexpected weight change, and fatigue/generalized weakness.      - Respiratory: Negative for cough, sputum production, chest congestion, dyspnea, wheezing, and crackles.      - Cardiovascular: Negative for chest pain, palpitations, orthopnea, and bilateral lower extremity edema.     - Psychiatric/Behavioral: Negative for depression, suicidal/homicidal ideation and memory loss.      All other  "systems reviewed and are negative    Exam:    /76 (BP Location: Left arm, Patient Position: Sitting, BP Cuff Size: Adult)   Pulse 64   Temp 36.7 °C (98.1 °F) (Temporal)   Resp 16   Ht 1.803 m (5' 11\")   Wt 93.2 kg (205 lb 9.3 oz)   SpO2 98%   BMI 28.67 kg/m²  Body mass index is 28.67 kg/m².    Physical Exam:  Constitutional: Well-developed and well-nourished. Not diaphoretic. No distress.   Cardiovascular: Regular rate and rhythm, S1 and S2 without murmur, rubs, or gallops.    Chest: Effort normal. Clear to auscultation throughout. No adventitious sounds. Neurological: Alert and oriented x 3.   Psychiatric:  Behavior, mood, and affect are appropriate.  MA/nursing note and vitals reviewed.    LABS: 2022  results reviewed and discussed with the patient, questions answered.    Assessment/Plan:  Essential hypertension   Latest Reference Range & Units 10/11/22 12:05   GFR (CKD-EPI) >60 mL/min/1.73 m 2 55 !   !: Data is abnormal  Chronic and stable.  GFR from October 2022 is slightly decreased at 55.  Patient is taking lisinopril 10 mg for the blood pressure control.  BP stable, 126/76 today.  Denies CP, dyspnea, dizziness or palpitations    Mixed hyperlipidemia   Latest Reference Range & Units 07/26/22 08:36   Cholesterol,Tot 100 - 199 mg/dL 169   Triglycerides 0 - 149 mg/dL 125   HDL >=40 mg/dL 36 !   LDL <100 mg/dL 108 (H)   !: Data is abnormal  (H): Data is abnormally high  Chronic and stable.  On atorvastatin 10 mg and tolerating well.  Needing refills today.    Rosacea, acne  Chronic and stable.  As needed utilizing metronidazole and needing refill today.    Decreased GFR   Latest Reference Range & Units 07/26/22 08:36 10/11/22 12:05   GFR (CKD-EPI) >60 mL/min/1.73 m 2 88 55 !   !: Data is abnormal    1. Elevated hemoglobin A1c  - HEMOGLOBIN A1C; Future    2. Essential hypertension  Stable on current regimen.  Continue.  Refills given   - CBC WITH DIFFERENTIAL; Future  - Comp Metabolic Panel; Future  - " HEMOGLOBIN A1C; Future  - lisinopril (PRINIVIL) 10 MG Tab; Take 1 Tablet by mouth every day.  Dispense: 90 Tablet; Refill: 0    3. Mixed hyperlipidemia  Stable on current regimen.  Continue.  Refills given   - Lipid Profile; Future  - atorvastatin (LIPITOR) 10 MG Tab; Take 1 Tablet by mouth every day.  Dispense: 90 Tablet; Refill: 0    4. Screening for prostate cancer  - PROSTATE SPECIFIC AG SCREENING; Future    5. Rosacea, acne  Stable on current regimen.  Continue.  Refills given   - metronidazole (METROGEL) 0.75 % gel; Apply 1 Application. topically every day. Indications: Rosacea  Dispense: 45 g; Refill: 1    6. Rosacea, acne  Stable on current regimen.  Continue.  Refills given       Discussed with patient possible alternative diagnoses, patient is to take all medications as prescribed.      If symptoms persist FU w/PCP, if symptoms worsen go to emergency room.      If experiencing any side effects from prescribed medications report to the office immediately or go to emergency room.     Reviewed indication, dosage, usage and potential adverse effects of prescribed medications.      Reviewed risks and benefits of treatment plan. Patient verbalizes understanding of all instruction and verbally agrees to plan.     Discussed plan with the patient, and patient agrees to the above.      I personally reviewed prior external notes and test results pertinent to today's visit.      No follow-ups on file. biannual

## 2023-10-09 ENCOUNTER — HOSPITAL ENCOUNTER (OUTPATIENT)
Dept: LAB | Facility: MEDICAL CENTER | Age: 73
End: 2023-10-09
Attending: NURSE PRACTITIONER
Payer: MEDICARE

## 2023-10-09 DIAGNOSIS — E78.2 MIXED HYPERLIPIDEMIA: ICD-10-CM

## 2023-10-09 DIAGNOSIS — Z12.5 SCREENING FOR PROSTATE CANCER: ICD-10-CM

## 2023-10-09 DIAGNOSIS — I10 ESSENTIAL HYPERTENSION: ICD-10-CM

## 2023-10-09 DIAGNOSIS — R73.09 ELEVATED HEMOGLOBIN A1C: ICD-10-CM

## 2023-10-09 LAB
ALBUMIN SERPL BCP-MCNC: 4.1 G/DL (ref 3.2–4.9)
ALBUMIN/GLOB SERPL: 1.5 G/DL
ALP SERPL-CCNC: 34 U/L (ref 30–99)
ALT SERPL-CCNC: 19 U/L (ref 2–50)
ANION GAP SERPL CALC-SCNC: 9 MMOL/L (ref 7–16)
AST SERPL-CCNC: 19 U/L (ref 12–45)
BASOPHILS # BLD AUTO: 1.2 % (ref 0–1.8)
BASOPHILS # BLD: 0.04 K/UL (ref 0–0.12)
BILIRUB SERPL-MCNC: 0.8 MG/DL (ref 0.1–1.5)
BUN SERPL-MCNC: 11 MG/DL (ref 8–22)
CALCIUM ALBUM COR SERPL-MCNC: 8.8 MG/DL (ref 8.5–10.5)
CALCIUM SERPL-MCNC: 8.9 MG/DL (ref 8.5–10.5)
CHLORIDE SERPL-SCNC: 105 MMOL/L (ref 96–112)
CHOLEST SERPL-MCNC: 179 MG/DL (ref 100–199)
CO2 SERPL-SCNC: 27 MMOL/L (ref 20–33)
CREAT SERPL-MCNC: 1.06 MG/DL (ref 0.5–1.4)
EOSINOPHIL # BLD AUTO: 0.08 K/UL (ref 0–0.51)
EOSINOPHIL NFR BLD: 2.4 % (ref 0–6.9)
ERYTHROCYTE [DISTWIDTH] IN BLOOD BY AUTOMATED COUNT: 42.7 FL (ref 35.9–50)
EST. AVERAGE GLUCOSE BLD GHB EST-MCNC: 114 MG/DL
FASTING STATUS PATIENT QL REPORTED: NORMAL
GFR SERPLBLD CREATININE-BSD FMLA CKD-EPI: 73 ML/MIN/1.73 M 2
GLOBULIN SER CALC-MCNC: 2.7 G/DL (ref 1.9–3.5)
GLUCOSE SERPL-MCNC: 95 MG/DL (ref 65–99)
HBA1C MFR BLD: 5.6 % (ref 4–5.6)
HCT VFR BLD AUTO: 46.6 % (ref 42–52)
HDLC SERPL-MCNC: 42 MG/DL
HGB BLD-MCNC: 15.4 G/DL (ref 14–18)
IMM GRANULOCYTES # BLD AUTO: 0.01 K/UL (ref 0–0.11)
IMM GRANULOCYTES NFR BLD AUTO: 0.3 % (ref 0–0.9)
LDLC SERPL CALC-MCNC: 107 MG/DL
LYMPHOCYTES # BLD AUTO: 1 K/UL (ref 1–4.8)
LYMPHOCYTES NFR BLD: 29.9 % (ref 22–41)
MCH RBC QN AUTO: 28.4 PG (ref 27–33)
MCHC RBC AUTO-ENTMCNC: 33 G/DL (ref 32.3–36.5)
MCV RBC AUTO: 86 FL (ref 81.4–97.8)
MONOCYTES # BLD AUTO: 0.27 K/UL (ref 0–0.85)
MONOCYTES NFR BLD AUTO: 8.1 % (ref 0–13.4)
NEUTROPHILS # BLD AUTO: 1.95 K/UL (ref 1.82–7.42)
NEUTROPHILS NFR BLD: 58.1 % (ref 44–72)
NRBC # BLD AUTO: 0 K/UL
NRBC BLD-RTO: 0 /100 WBC (ref 0–0.2)
PLATELET # BLD AUTO: 208 K/UL (ref 164–446)
PMV BLD AUTO: 10 FL (ref 9–12.9)
POTASSIUM SERPL-SCNC: 4.5 MMOL/L (ref 3.6–5.5)
PROT SERPL-MCNC: 6.8 G/DL (ref 6–8.2)
PSA SERPL-MCNC: 0.65 NG/ML (ref 0–4)
RBC # BLD AUTO: 5.42 M/UL (ref 4.7–6.1)
SODIUM SERPL-SCNC: 141 MMOL/L (ref 135–145)
TRIGL SERPL-MCNC: 152 MG/DL (ref 0–149)
WBC # BLD AUTO: 3.4 K/UL (ref 4.8–10.8)

## 2023-10-09 PROCEDURE — 80061 LIPID PANEL: CPT

## 2023-10-09 PROCEDURE — 83036 HEMOGLOBIN GLYCOSYLATED A1C: CPT | Mod: GA

## 2023-10-09 PROCEDURE — 85025 COMPLETE CBC W/AUTO DIFF WBC: CPT

## 2023-10-09 PROCEDURE — 84153 ASSAY OF PSA TOTAL: CPT | Mod: GA

## 2023-10-09 PROCEDURE — 36415 COLL VENOUS BLD VENIPUNCTURE: CPT

## 2023-10-09 PROCEDURE — 80053 COMPREHEN METABOLIC PANEL: CPT

## 2023-10-17 ENCOUNTER — OFFICE VISIT (OUTPATIENT)
Dept: MEDICAL GROUP | Facility: MEDICAL CENTER | Age: 73
End: 2023-10-17
Payer: MEDICARE

## 2023-10-17 VITALS
TEMPERATURE: 96.9 F | DIASTOLIC BLOOD PRESSURE: 62 MMHG | HEART RATE: 55 BPM | OXYGEN SATURATION: 95 % | BODY MASS INDEX: 27.8 KG/M2 | WEIGHT: 198.6 LBS | SYSTOLIC BLOOD PRESSURE: 110 MMHG | HEIGHT: 71 IN

## 2023-10-17 DIAGNOSIS — E78.2 MIXED HYPERLIPIDEMIA: ICD-10-CM

## 2023-10-17 DIAGNOSIS — Z02.9 ADMINISTRATIVE ENCOUNTER: ICD-10-CM

## 2023-10-17 DIAGNOSIS — I10 ESSENTIAL HYPERTENSION: ICD-10-CM

## 2023-10-17 PROCEDURE — 3074F SYST BP LT 130 MM HG: CPT | Performed by: NURSE PRACTITIONER

## 2023-10-17 PROCEDURE — 3078F DIAST BP <80 MM HG: CPT | Performed by: NURSE PRACTITIONER

## 2023-10-17 PROCEDURE — 99214 OFFICE O/P EST MOD 30 MIN: CPT | Performed by: NURSE PRACTITIONER

## 2023-10-17 RX ORDER — LISINOPRIL 10 MG/1
10 TABLET ORAL DAILY
Qty: 90 TABLET | Refills: 3 | Status: SHIPPED | OUTPATIENT
Start: 2023-10-17

## 2023-10-17 RX ORDER — ATORVASTATIN CALCIUM 10 MG/1
10 TABLET, FILM COATED ORAL DAILY
Qty: 90 TABLET | Refills: 3 | Status: SHIPPED | OUTPATIENT
Start: 2023-10-17

## 2023-10-17 ASSESSMENT — FIBROSIS 4 INDEX: FIB4 SCORE: 1.55

## 2023-10-17 NOTE — PROGRESS NOTES
Chief Complaint   Patient presents with    Medication Refill     Lisinopril and lipitor       HISTORY OF PRESENT ILLNESS: Patient is a 73 y.o. male, established patient who presents today to discuss medical problems as listed below:    Health Maintenance:  COMPLETED       Allergies: Nkda [no known drug allergy]    Current Outpatient Medications   Medication Sig Dispense Refill    atorvastatin (LIPITOR) 10 MG Tab Take 1 Tablet by mouth every day. 90 Tablet 3    lisinopril (PRINIVIL) 10 MG Tab Take 1 Tablet by mouth every day. 90 Tablet 3    metronidazole (METROGEL) 0.75 % gel Apply 1 Application. topically every day. Indications: Rosacea 45 g 1    Glucos-Chondroit-Hyaluron-MSM (GLUCOSAMINE CHONDROITIN JOINT PO) Take 2 Tablets by mouth every day.      multivitamin (THERAGRAN) Tab Take 1 Tablet by mouth every day.      Cholecalciferol (D3 PO) Take 1 Capsule by mouth every day.      Coenzyme Q10 (CO Q 10 PO) Take 1 Capsule by mouth every day.      valacyclovir (VALTREX) 1 GM Tab Take 1 tablet by mouth twice daily as needed (Patient taking differently: Take 1,000 mg by mouth 2 times a day as needed (For cold sores).) 60 Tablet 0    chlorhexidine (PERIDEX) 0.12 % Solution Take 15 mL by mouth 2 times a day.      ibuprofen (MOTRIN) 800 MG Tab Take 800 mg by mouth 2 times a day.       No current facility-administered medications for this visit.       Allergies, past medical history, past surgical history, family history, social history reviewed and updated.    Review of Systems:     - Constitutional: Negative for fever, chills, unexpected weight change, and fatigue/generalized weakness.      - Respiratory: Negative for cough, sputum production, chest congestion, dyspnea, wheezing, and crackles.      - Cardiovascular: Negative for chest pain, palpitations, orthopnea, and bilateral lower extremity edema.      - Psychiatric/Behavioral: Negative for depression, suicidal/homicidal ideation and memory loss.      All other systems  "reviewed and are negative    Exam:    /62 (BP Location: Left arm, Patient Position: Sitting, BP Cuff Size: Adult)   Pulse (!) 55   Temp 36.1 °C (96.9 °F) (Temporal)   Ht 1.803 m (5' 11\")   Wt 90.1 kg (198 lb 9.6 oz)   SpO2 95%   BMI 27.70 kg/m²  Body mass index is 27.7 kg/m².    Physical Exam:  Constitutional: Well-developed and well-nourished. Not diaphoretic. No distress.   Cardiovascular: Regular rate and rhythm, S1 and S2 without murmur, rubs, or gallops.    Chest: Effort normal. Clear to auscultation throughout. No adventitious sounds. Neurological: Alert and oriented x 3.   Psychiatric:  Behavior, mood, and affect are appropriate.  MA/nursing note and vitals reviewed.    LABS: 9/2023  results reviewed and discussed with the patient, questions answered.    Assessment/Plan:  Mixed hyperlipidemia   Latest Reference Range & Units 10/09/23 10:03   Cholesterol,Tot 100 - 199 mg/dL 179   Triglycerides 0 - 149 mg/dL 152 (H)   HDL >=40 mg/dL 42   LDL <100 mg/dL 107 (H)   (H): Data is abnormally high.  Chronic and stable. On Lipitor 10 mg, tolerating well.     Essential hypertension   Latest Reference Range & Units 10/09/23 10:03   GFR (CKD-EPI) >60 mL/min/1.73 m 2 73   Chronic and stable. On Lisinopril 10 mg , needing refills today.    Administrative encounter  Patient is here for paperwork, diving clearance paperwork.    History of hypertension and dyslipidemia, well controlled.  No respiratory issues.  Vitals today are WNL.  Pulse ox today is 98% on room air, BP is 110/62 with a pulse of 55.  He denies any changes in his health, no changes in vision, hearing or ROM.  Paperwork filled out, scanned to media and given to patient.    1. Mixed hyperlipidemia  Stable on current regimen.  Continue.  Refills given   - atorvastatin (LIPITOR) 10 MG Tab; Take 1 Tablet by mouth every day.  Dispense: 90 Tablet; Refill: 3    2. Essential hypertension  Stable on current regimen.  Continue.  Refills given   - lisinopril " (PRINIVIL) 10 MG Tab; Take 1 Tablet by mouth every day.  Dispense: 90 Tablet; Refill: 3    3. Administrative encounter  Paperwork filled out and given to patient.  Copy scanned into media.      Discussed with patient possible alternative diagnoses, patient is to take all medications as prescribed.      If symptoms persist FU w/PCP, if symptoms worsen go to emergency room.      If experiencing any side effects from prescribed medications report to the office immediately or go to emergency room.     Reviewed indication, dosage, usage and potential adverse effects of prescribed medications.      Reviewed risks and benefits of treatment plan. Patient verbalizes understanding of all instruction and verbally agrees to plan.     Discussed plan with the patient, and patient agrees to the above.      I personally reviewed prior external notes and test results pertinent to today's visit.      No follow-ups on file.  6 mo

## 2023-10-17 NOTE — ASSESSMENT & PLAN NOTE
Latest Reference Range & Units 10/09/23 10:03   Cholesterol,Tot 100 - 199 mg/dL 179   Triglycerides 0 - 149 mg/dL 152 (H)   HDL >=40 mg/dL 42   LDL <100 mg/dL 107 (H)   (H): Data is abnormally high.  Chronic and stable. On Lipitor 10 mg, tolerating well.

## 2023-10-17 NOTE — ASSESSMENT & PLAN NOTE
Patient is here for paperwork, diving clearance paperwork.    History of hypertension and dyslipidemia, well controlled.  No respiratory issues.  Vitals today are WNL.  Pulse ox today is 98% on room air, BP is 110/62 with a pulse of 55.  He denies any changes in his health, no changes in vision, hearing or ROM.  Paperwork filled out, scanned to media and given to patient.

## 2023-10-17 NOTE — ASSESSMENT & PLAN NOTE
Latest Reference Range & Units 10/09/23 10:03   GFR (CKD-EPI) >60 mL/min/1.73 m 2 73   Chronic and stable. On Lisinopril 10 mg , needing refills today.

## 2023-11-29 ENCOUNTER — PATIENT MESSAGE (OUTPATIENT)
Dept: HEALTH INFORMATION MANAGEMENT | Facility: OTHER | Age: 73
End: 2023-11-29

## 2023-12-05 SDOH — ECONOMIC STABILITY: FOOD INSECURITY: WITHIN THE PAST 12 MONTHS, THE FOOD YOU BOUGHT JUST DIDN'T LAST AND YOU DIDN'T HAVE MONEY TO GET MORE.: NEVER TRUE

## 2023-12-05 SDOH — HEALTH STABILITY: PHYSICAL HEALTH: ON AVERAGE, HOW MANY DAYS PER WEEK DO YOU ENGAGE IN MODERATE TO STRENUOUS EXERCISE (LIKE A BRISK WALK)?: 3 DAYS

## 2023-12-05 SDOH — ECONOMIC STABILITY: INCOME INSECURITY: IN THE LAST 12 MONTHS, WAS THERE A TIME WHEN YOU WERE NOT ABLE TO PAY THE MORTGAGE OR RENT ON TIME?: NO

## 2023-12-05 SDOH — ECONOMIC STABILITY: FOOD INSECURITY: WITHIN THE PAST 12 MONTHS, YOU WORRIED THAT YOUR FOOD WOULD RUN OUT BEFORE YOU GOT MONEY TO BUY MORE.: NEVER TRUE

## 2023-12-05 SDOH — ECONOMIC STABILITY: INCOME INSECURITY: HOW HARD IS IT FOR YOU TO PAY FOR THE VERY BASICS LIKE FOOD, HOUSING, MEDICAL CARE, AND HEATING?: NOT HARD AT ALL

## 2023-12-05 SDOH — HEALTH STABILITY: PHYSICAL HEALTH: ON AVERAGE, HOW MANY MINUTES DO YOU ENGAGE IN EXERCISE AT THIS LEVEL?: 10 MIN

## 2023-12-05 SDOH — ECONOMIC STABILITY: HOUSING INSECURITY: IN THE LAST 12 MONTHS, HOW MANY PLACES HAVE YOU LIVED?: 1

## 2023-12-05 ASSESSMENT — SOCIAL DETERMINANTS OF HEALTH (SDOH)
WITHIN THE PAST 12 MONTHS, YOU WORRIED THAT YOUR FOOD WOULD RUN OUT BEFORE YOU GOT THE MONEY TO BUY MORE: NEVER TRUE
HOW OFTEN DO YOU ATTENT MEETINGS OF THE CLUB OR ORGANIZATION YOU BELONG TO?: MORE THAN 4 TIMES PER YEAR
HOW OFTEN DO YOU GET TOGETHER WITH FRIENDS OR RELATIVES?: TWICE A WEEK
DO YOU BELONG TO ANY CLUBS OR ORGANIZATIONS SUCH AS CHURCH GROUPS UNIONS, FRATERNAL OR ATHLETIC GROUPS, OR SCHOOL GROUPS?: YES
HOW OFTEN DO YOU ATTEND CHURCH OR RELIGIOUS SERVICES?: NEVER
IN A TYPICAL WEEK, HOW MANY TIMES DO YOU TALK ON THE PHONE WITH FAMILY, FRIENDS, OR NEIGHBORS?: ONCE A WEEK
HOW OFTEN DO YOU ATTENT MEETINGS OF THE CLUB OR ORGANIZATION YOU BELONG TO?: MORE THAN 4 TIMES PER YEAR
HOW HARD IS IT FOR YOU TO PAY FOR THE VERY BASICS LIKE FOOD, HOUSING, MEDICAL CARE, AND HEATING?: NOT HARD AT ALL
HOW OFTEN DO YOU GET TOGETHER WITH FRIENDS OR RELATIVES?: TWICE A WEEK
HOW OFTEN DO YOU HAVE A DRINK CONTAINING ALCOHOL: 2-4 TIMES A MONTH
HOW OFTEN DO YOU ATTEND CHURCH OR RELIGIOUS SERVICES?: NEVER
DO YOU BELONG TO ANY CLUBS OR ORGANIZATIONS SUCH AS CHURCH GROUPS UNIONS, FRATERNAL OR ATHLETIC GROUPS, OR SCHOOL GROUPS?: YES
HOW OFTEN DO YOU HAVE SIX OR MORE DRINKS ON ONE OCCASION: NEVER
HOW MANY DRINKS CONTAINING ALCOHOL DO YOU HAVE ON A TYPICAL DAY WHEN YOU ARE DRINKING: 1 OR 2
IN A TYPICAL WEEK, HOW MANY TIMES DO YOU TALK ON THE PHONE WITH FAMILY, FRIENDS, OR NEIGHBORS?: ONCE A WEEK

## 2023-12-05 ASSESSMENT — LIFESTYLE VARIABLES
SKIP TO QUESTIONS 9-10: 1
AUDIT-C TOTAL SCORE: 2
HOW MANY STANDARD DRINKS CONTAINING ALCOHOL DO YOU HAVE ON A TYPICAL DAY: 1 OR 2
HOW OFTEN DO YOU HAVE SIX OR MORE DRINKS ON ONE OCCASION: NEVER
HOW OFTEN DO YOU HAVE A DRINK CONTAINING ALCOHOL: 2-4 TIMES A MONTH

## 2023-12-08 ENCOUNTER — OFFICE VISIT (OUTPATIENT)
Dept: MEDICAL GROUP | Facility: MEDICAL CENTER | Age: 73
End: 2023-12-08
Payer: MEDICARE

## 2023-12-08 VITALS
DIASTOLIC BLOOD PRESSURE: 62 MMHG | OXYGEN SATURATION: 95 % | HEART RATE: 55 BPM | TEMPERATURE: 97.1 F | BODY MASS INDEX: 28.25 KG/M2 | WEIGHT: 201.8 LBS | HEIGHT: 71 IN | SYSTOLIC BLOOD PRESSURE: 90 MMHG

## 2023-12-08 DIAGNOSIS — Z12.5 SCREENING FOR PROSTATE CANCER: ICD-10-CM

## 2023-12-08 DIAGNOSIS — E78.2 MIXED HYPERLIPIDEMIA: ICD-10-CM

## 2023-12-08 DIAGNOSIS — Z00.00 MEDICARE ANNUAL WELLNESS VISIT, SUBSEQUENT: ICD-10-CM

## 2023-12-08 DIAGNOSIS — I10 ESSENTIAL HYPERTENSION: ICD-10-CM

## 2023-12-08 PROCEDURE — 3074F SYST BP LT 130 MM HG: CPT | Performed by: NURSE PRACTITIONER

## 2023-12-08 PROCEDURE — G0439 PPPS, SUBSEQ VISIT: HCPCS | Performed by: NURSE PRACTITIONER

## 2023-12-08 PROCEDURE — 3078F DIAST BP <80 MM HG: CPT | Performed by: NURSE PRACTITIONER

## 2023-12-08 ASSESSMENT — ENCOUNTER SYMPTOMS: GENERAL WELL-BEING: GOOD

## 2023-12-08 ASSESSMENT — ACTIVITIES OF DAILY LIVING (ADL): BATHING_REQUIRES_ASSISTANCE: 0

## 2023-12-08 ASSESSMENT — PATIENT HEALTH QUESTIONNAIRE - PHQ9: CLINICAL INTERPRETATION OF PHQ2 SCORE: 0

## 2023-12-08 ASSESSMENT — FIBROSIS 4 INDEX: FIB4 SCORE: 1.55

## 2023-12-08 NOTE — PROGRESS NOTES
Chief Complaint   Patient presents with    Medicare Annual Wellness       HPI:  Gosia Tejada is a 73 y.o. here for Medicare Annual Wellness Visit     Patient Active Problem List    Diagnosis Date Noted    Decreased GFR 05/04/2023    Administrative encounter 10/08/2021    WBC decreased 07/14/2020    Preoperative clearance 08/20/2019    Prediabetes 07/30/2019    OA (osteoarthritis) 07/30/2019    Essential hypertension 07/22/2019    Mixed hyperlipidemia 07/22/2019    Herpes 07/22/2019    Rosacea, acne 07/22/2019       Current Outpatient Medications   Medication Sig Dispense Refill    atorvastatin (LIPITOR) 10 MG Tab Take 1 Tablet by mouth every day. 90 Tablet 3    lisinopril (PRINIVIL) 10 MG Tab Take 1 Tablet by mouth every day. 90 Tablet 3    metronidazole (METROGEL) 0.75 % gel Apply 1 Application. topically every day. Indications: Rosacea 45 g 1    Glucos-Chondroit-Hyaluron-MSM (GLUCOSAMINE CHONDROITIN JOINT PO) Take 2 Tablets by mouth every day.      multivitamin (THERAGRAN) Tab Take 1 Tablet by mouth every day.      Cholecalciferol (D3 PO) Take 1 Capsule by mouth every day.      Coenzyme Q10 (CO Q 10 PO) Take 1 Capsule by mouth every day.      valacyclovir (VALTREX) 1 GM Tab Take 1 tablet by mouth twice daily as needed (Patient taking differently: Take 1,000 mg by mouth 2 times a day as needed (For cold sores).) 60 Tablet 0    ibuprofen (MOTRIN) 800 MG Tab Take 800 mg by mouth 2 times a day.       No current facility-administered medications for this visit.          Current supplements as per medication list.     Allergies: Nkda [no known drug allergy]    Current social contact/activities: Motorcycle riding, driving, hanging gout with friends, reading.  Pt is working for the [employer], a reader.     He  reports that he has never smoked. He has never used smokeless tobacco. He reports current alcohol use of about 1.2 oz of alcohol per week. He reports that he does not use drugs.  Counseling given: Not  Answered      ROS:    Gait: Uses no assistive device  Ostomy: No  Other tubes: No  Amputations: No  Chronic oxygen use: NO  Last eye exam: May 2023  Wears hearing aids: No   : Denies any urinary leakage during the last 6 months    Screening:    Depression Screening  Little interest or pleasure in doing things?   0  Feeling down, depressed , or hopeless?  0  Trouble falling or staying asleep, or sleeping too much?   0  Feeling tired or having little energy?   0  Poor appetite or overeating?   0  Feeling bad about yourself - or that you are a failure or have let yourself or your family down?  0  Trouble concentrating on things, such as reading the newspaper or watching television?  0  Moving or speaking so slowly that other people could have noticed.  Or the opposite - being so fidgety or restless that you have been moving around a lot more than usual?   0  Thoughts that you would be better off dead, or of hurting yourself?   0  Patient Health Questionnaire Score:  0    If depressive symptoms identified deferred to follow up visit unless specifically addressed in assessment and plan.    Interpretation of PHQ-9 Total Score   Score Severity   1-4 No Depression   5-9 Mild Depression   10-14 Moderate Depression   15-19 Moderately Severe Depression   20-27 Severe Depression    Screening for Cognitive Impairment  Do you or any of your friends or family members have any concern about your memory?  0  Three Minute Recall (Banana, Sunrise, Chair)3  /3    Ashwin clock face with all 12 numbers and set the hands to show 20 past 8.   Completed the task  Cognitive concerns identified deferred for follow up unless specifically addressed in assessment and plan.    Fall Risk Assessment  Has the patient had two or more falls in the last year or any fall with injury in the last year?   0    Safety Assessment  Do you always wear your seatbelt?   yes  Any changes to home needed to function safely?  no  Difficulty hearing.   no  Patient  counseled about all safety risks that were identified.    Functional Assessment ADLs  Are there any barriers preventing you from cooking for yourself or meeting nutritional needs?  no .    Are there any barriers preventing you from driving safely or obtaining transportation?   .  no  Are there any barriers preventing you from using a telephone or calling for help?    no   Are there any barriers preventing you from shopping?  no .    Are there any barriers preventing you from taking care of your own finances?    no   Are there any barriers preventing you from managing your medications?     no  Are there any barriers preventing you from showering, bathing or dressing yourself?   no   Are there any barriers preventing you from doing housework or laundry?   no   Are there any barriers preventing you from using the toilet?   no  Are you currently engaging in any exercise or physical activity?   .  no    Self-Assessment of Health  What is your perception of your health?   good   Do you sleep more than six hours a night?    6-8 hrs  In the past 7 days, how much did pain keep you from doing your normal work?    0  Do you spend quality time with family or friends (virtually or in person)?    yes  Do you usually eat a heart healthy diet that constists of a variety of fruits, vegetables, whole grains and fiber?    yes  Do you eat foods high in fat and/or Fast Food more than three times per week?    no  How concerned are you that your medical conditions are not being well managed?  Not concerned         Advance Care Planning  Do you have an Advance Directive, Living Will, Durable Power of , or POLST?            No, a form given to fill out      Health Maintenance Summary            Overdue - Annual Wellness Visit (Every 366 Days) Overdue since 12/7/2023 12/06/2022  Visit Dx: Medicare annual wellness visit, subsequent    09/21/2021  Done    07/30/2019  Done    07/30/2019  Visit Dx: Medicare annual wellness visit,  subsequent              COVID-19 Vaccine (2 - - season) Next due on 2023      10/23/2023  Imm Admin: Covid-19 Mrna (Spikevax) Moderna 12+ Years              Colorectal Cancer Screening (Colonoscopy - Every 10 Years) Tentatively due on 2021  COLOGUARD COLON CANCER SCREENING    2021  COLOGUARD COLON CANCER SCREENING    2017  REFERRAL TO GI FOR COLONOSCOPY    2013  REFERRAL TO GI FOR COLONOSCOPY              IMM DTaP/Tdap/Td Vaccine (2 - Td or Tdap) Next due on 2020  Imm Admin: Tdap Vaccine              Pneumococcal Vaccine: 65+ Years (Series Information) Completed      2016  Imm Admin: Pneumococcal Conjugate Vaccine (Prevnar/PCV-13)    2014  Imm Admin: Pneumococcal polysaccharide vaccine (PPSV-23)    2014  Imm Admin: Pneumococcal polysaccharide vaccine (PPSV-23)              Hepatitis C Screening  Tentatively Complete      2020  Hepatitis C Antibody component of HCV Scrn ( 0571-5382 1xLife)              Influenza Vaccine (Series Information) Completed      2023  Outside Immunization: Fluzone High-Dose Quad    10/08/2021  Imm Admin: Influenza Vaccine Adult HD    10/01/2020  Imm Admin: Influenza Vaccine Adult HD    2019  Imm Admin: Influenza Vaccine Adult HD    10/26/2018  Imm Admin: Influenza Vaccine Adult HD    Only the first 5 history entries have been loaded, but more history exists.              Hepatitis A Vaccine (Hep A) (Series Information) Aged Out      No completion history exists for this topic.              Hepatitis B Vaccine (Hep B) (Series Information) Aged Out      No completion history exists for this topic.              HPV Vaccines (Series Information) Aged Out      No completion history exists for this topic.              Polio Vaccine (Inactivated Polio) (Series Information) Aged Out      No completion history exists for this topic.              Meningococcal Immunization (Series  Information) Aged Out      No completion history exists for this topic.              Discontinued - Zoster (Shingles) Vaccines  Discontinued      06/28/2021  Imm Admin: Zoster Vaccine Recombinant (RZV) (SHINGRIX)                    Patient Care Team:  ZULEYMA Varela as PCP - General (Family Medicine)      Social History     Tobacco Use    Smoking status: Never    Smokeless tobacco: Never   Vaping Use    Vaping Use: Never used   Substance Use Topics    Alcohol use: Yes     Alcohol/week: 1.2 oz     Types: 2 Cans of beer per week     Comment: 2 per week    Drug use: No     Family History   Adopted: Yes     He  has a past medical history of Arthritis, Dental disorder (07/2020), Heart burn, High cholesterol, Hyperlipemia, Hypertension, Rosacea, and Seasonal allergies.   Past Surgical History:   Procedure Laterality Date    EXTENSOR TENDON REPAIR Left 9/11/2020    Procedure: REPAIR, TENDON, EXTENSOR-THUMB POLLICIS LONGUS VERSUS;  Surgeon: Kathleen Rai M.D.;  Location: SURGERY SAME DAY [facility];  Service: Orthopedics    HIP ARTHROPLASTY TOTAL Right 2019    HIP ARTHROPLASTY TOTAL  11/20/2014    Left; Performed by Lucien Fierro M.D. at SURGERY [facility]    JOINT INJECTION DIAGNOSTIC  2/20/2014    Performed by Lucien Fierro M.D. at SURGERY [facility]    JOINT INJECTION DIAGNOSTIC  9/12/2013    Performed by Lucien Fierro M.D. at SURGERY [facility]    OTHER  1955    TONSILLECTOMY    OTHER      VASECTOMY    OTHER ORTHOPEDIC SURGERY      TONSILLECTOMY         Exam:   There were no vitals taken for this visit. There is no height or weight on file to calculate BMI.    Hearing good.    Dentition good  Alert, oriented in no acute distress.  Eye contact is good, speech goal directed, affect calm    Assessment and Plan. The following treatment and monitoring plan is recommended:    There are no diagnoses linked to this encounter.    Services suggested: No services needed at this time  Health Care  Screening: Age-appropriate preventive services recommended by USPTF and ACIP covered by Medicare were discussed today. Services ordered if indicated and agreed upon by the patient.  Referrals offered: Community-based lifestyle interventions to reduce health risks and promote self-management and wellness, fall prevention, nutrition, physical activity, tobacco-use cessation, weight loss, and mental health services as per orders if indicated.    Discussion today about general wellness and lifestyle habits:    Prevent falls and reduce trip hazards; Cautioned about securing or removing rugs.  Have a working fire alarm and carbon monoxide detector;   Engage in regular physical activity and social activities     Follow-up: No follow-ups on file.

## 2024-06-19 DIAGNOSIS — B00.9 HERPES: ICD-10-CM

## 2024-06-19 DIAGNOSIS — L71.9 ROSACEA, ACNE: ICD-10-CM

## 2024-06-20 RX ORDER — METRONIDAZOLE 7.5 MG/G
1 GEL TOPICAL DAILY
Qty: 45 G | Refills: 1 | Status: SHIPPED | OUTPATIENT
Start: 2024-06-20

## 2024-06-20 RX ORDER — VALACYCLOVIR HYDROCHLORIDE 1 G/1
1000 TABLET, FILM COATED ORAL 2 TIMES DAILY PRN
Qty: 180 TABLET | Refills: 0 | Status: SHIPPED | OUTPATIENT
Start: 2024-06-20

## 2024-10-01 DIAGNOSIS — E78.2 MIXED HYPERLIPIDEMIA: ICD-10-CM

## 2024-10-01 DIAGNOSIS — I10 ESSENTIAL HYPERTENSION: ICD-10-CM

## 2024-10-01 RX ORDER — ATORVASTATIN CALCIUM 10 MG/1
10 TABLET, FILM COATED ORAL DAILY
Qty: 90 TABLET | Refills: 0 | Status: SHIPPED | OUTPATIENT
Start: 2024-10-01 | End: 2024-10-31 | Stop reason: SDUPTHER

## 2024-10-01 RX ORDER — LISINOPRIL 10 MG/1
10 TABLET ORAL DAILY
Qty: 90 TABLET | Refills: 0 | Status: SHIPPED | OUTPATIENT
Start: 2024-10-01 | End: 2024-10-31 | Stop reason: SDUPTHER

## 2024-10-16 ENCOUNTER — HOSPITAL ENCOUNTER (OUTPATIENT)
Dept: LAB | Facility: MEDICAL CENTER | Age: 74
End: 2024-10-16
Attending: NURSE PRACTITIONER
Payer: MEDICARE

## 2024-10-16 DIAGNOSIS — E78.2 MIXED HYPERLIPIDEMIA: ICD-10-CM

## 2024-10-16 DIAGNOSIS — Z12.5 SCREENING FOR PROSTATE CANCER: ICD-10-CM

## 2024-10-16 DIAGNOSIS — I10 ESSENTIAL HYPERTENSION: ICD-10-CM

## 2024-10-16 LAB
ALBUMIN SERPL BCP-MCNC: 4 G/DL (ref 3.2–4.9)
ALBUMIN/GLOB SERPL: 1.5 G/DL
ALP SERPL-CCNC: 38 U/L (ref 30–99)
ALT SERPL-CCNC: 23 U/L (ref 2–50)
ANION GAP SERPL CALC-SCNC: 12 MMOL/L (ref 7–16)
AST SERPL-CCNC: 20 U/L (ref 12–45)
BILIRUB SERPL-MCNC: 0.8 MG/DL (ref 0.1–1.5)
BUN SERPL-MCNC: 13 MG/DL (ref 8–22)
CALCIUM ALBUM COR SERPL-MCNC: 9.6 MG/DL (ref 8.5–10.5)
CALCIUM SERPL-MCNC: 9.6 MG/DL (ref 8.5–10.5)
CHLORIDE SERPL-SCNC: 103 MMOL/L (ref 96–112)
CHOLEST SERPL-MCNC: 161 MG/DL (ref 100–199)
CO2 SERPL-SCNC: 26 MMOL/L (ref 20–33)
CREAT SERPL-MCNC: 0.98 MG/DL (ref 0.5–1.4)
ERYTHROCYTE [DISTWIDTH] IN BLOOD BY AUTOMATED COUNT: 39.8 FL (ref 35.9–50)
FASTING STATUS PATIENT QL REPORTED: NORMAL
GFR SERPLBLD CREATININE-BSD FMLA CKD-EPI: 80 ML/MIN/1.73 M 2
GLOBULIN SER CALC-MCNC: 2.7 G/DL (ref 1.9–3.5)
GLUCOSE SERPL-MCNC: 85 MG/DL (ref 65–99)
HCT VFR BLD AUTO: 45.9 % (ref 42–52)
HDLC SERPL-MCNC: 41 MG/DL
HGB BLD-MCNC: 15.2 G/DL (ref 14–18)
LDLC SERPL CALC-MCNC: 99 MG/DL
MCH RBC QN AUTO: 29.2 PG (ref 27–33)
MCHC RBC AUTO-ENTMCNC: 33.1 G/DL (ref 32.3–36.5)
MCV RBC AUTO: 88.1 FL (ref 81.4–97.8)
PLATELET # BLD AUTO: 228 K/UL (ref 164–446)
PMV BLD AUTO: 10 FL (ref 9–12.9)
POTASSIUM SERPL-SCNC: 4.4 MMOL/L (ref 3.6–5.5)
PROT SERPL-MCNC: 6.7 G/DL (ref 6–8.2)
RBC # BLD AUTO: 5.21 M/UL (ref 4.7–6.1)
SODIUM SERPL-SCNC: 141 MMOL/L (ref 135–145)
TRIGL SERPL-MCNC: 107 MG/DL (ref 0–149)
WBC # BLD AUTO: 3.6 K/UL (ref 4.8–10.8)

## 2024-10-16 PROCEDURE — 80061 LIPID PANEL: CPT

## 2024-10-16 PROCEDURE — 36415 COLL VENOUS BLD VENIPUNCTURE: CPT

## 2024-10-16 PROCEDURE — 84154 ASSAY OF PSA FREE: CPT

## 2024-10-16 PROCEDURE — 85027 COMPLETE CBC AUTOMATED: CPT

## 2024-10-16 PROCEDURE — 84153 ASSAY OF PSA TOTAL: CPT | Mod: GA

## 2024-10-16 PROCEDURE — 80053 COMPREHEN METABOLIC PANEL: CPT

## 2024-10-19 LAB
PSA FREE MFR SERPL: 29 %
PSA FREE SERPL-MCNC: 0.2 NG/ML
PSA SERPL-MCNC: 0.7 NG/ML (ref 0–4)

## 2024-10-31 ENCOUNTER — OFFICE VISIT (OUTPATIENT)
Dept: MEDICAL GROUP | Facility: MEDICAL CENTER | Age: 74
End: 2024-10-31
Payer: MEDICARE

## 2024-10-31 VITALS
BODY MASS INDEX: 30.68 KG/M2 | WEIGHT: 214.29 LBS | OXYGEN SATURATION: 96 % | SYSTOLIC BLOOD PRESSURE: 134 MMHG | DIASTOLIC BLOOD PRESSURE: 72 MMHG | HEIGHT: 70 IN | TEMPERATURE: 98.1 F | HEART RATE: 62 BPM

## 2024-10-31 DIAGNOSIS — E78.2 MIXED HYPERLIPIDEMIA: ICD-10-CM

## 2024-10-31 DIAGNOSIS — Z71.2 ENCOUNTER TO DISCUSS TEST RESULTS: ICD-10-CM

## 2024-10-31 DIAGNOSIS — R20.0 NUMBNESS OF TOES: ICD-10-CM

## 2024-10-31 DIAGNOSIS — D72.819 LEUKOPENIA, UNSPECIFIED TYPE: ICD-10-CM

## 2024-10-31 DIAGNOSIS — I10 ESSENTIAL HYPERTENSION: ICD-10-CM

## 2024-10-31 DIAGNOSIS — B00.9 HERPES: ICD-10-CM

## 2024-10-31 DIAGNOSIS — K64.8 INTERNAL HEMORRHOIDS: ICD-10-CM

## 2024-10-31 PROBLEM — Z01.818 PREOPERATIVE CLEARANCE: Status: RESOLVED | Noted: 2019-08-20 | Resolved: 2024-10-31

## 2024-10-31 PROBLEM — R73.03 PREDIABETES: Status: RESOLVED | Noted: 2019-07-30 | Resolved: 2024-10-31

## 2024-10-31 PROBLEM — Z02.9 ADMINISTRATIVE ENCOUNTER: Status: RESOLVED | Noted: 2021-10-08 | Resolved: 2024-10-31

## 2024-10-31 RX ORDER — LISINOPRIL 10 MG/1
10 TABLET ORAL DAILY
Qty: 90 TABLET | Refills: 3 | Status: SHIPPED | OUTPATIENT
Start: 2024-10-31

## 2024-10-31 RX ORDER — ATORVASTATIN CALCIUM 10 MG/1
10 TABLET, FILM COATED ORAL DAILY
Qty: 90 TABLET | Refills: 3 | Status: SHIPPED | OUTPATIENT
Start: 2024-10-31

## 2024-10-31 ASSESSMENT — FIBROSIS 4 INDEX: FIB4 SCORE: 1.37

## 2024-10-31 ASSESSMENT — ENCOUNTER SYMPTOMS
CHILLS: 0
PALPITATIONS: 0
FEVER: 0

## 2024-10-31 ASSESSMENT — PATIENT HEALTH QUESTIONNAIRE - PHQ9: CLINICAL INTERPRETATION OF PHQ2 SCORE: 0

## 2024-11-22 ENCOUNTER — APPOINTMENT (OUTPATIENT)
Dept: MEDICAL GROUP | Facility: MEDICAL CENTER | Age: 74
End: 2024-11-22
Payer: MEDICARE

## 2025-04-27 SDOH — ECONOMIC STABILITY: INCOME INSECURITY: IN THE LAST 12 MONTHS, WAS THERE A TIME WHEN YOU WERE NOT ABLE TO PAY THE MORTGAGE OR RENT ON TIME?: NO

## 2025-04-27 SDOH — HEALTH STABILITY: PHYSICAL HEALTH: ON AVERAGE, HOW MANY DAYS PER WEEK DO YOU ENGAGE IN MODERATE TO STRENUOUS EXERCISE (LIKE A BRISK WALK)?: 0 DAYS

## 2025-04-27 SDOH — ECONOMIC STABILITY: FOOD INSECURITY: WITHIN THE PAST 12 MONTHS, YOU WORRIED THAT YOUR FOOD WOULD RUN OUT BEFORE YOU GOT MONEY TO BUY MORE.: NEVER TRUE

## 2025-04-27 SDOH — HEALTH STABILITY: PHYSICAL HEALTH: ON AVERAGE, HOW MANY MINUTES DO YOU ENGAGE IN EXERCISE AT THIS LEVEL?: 0 MIN

## 2025-04-27 SDOH — ECONOMIC STABILITY: FOOD INSECURITY: WITHIN THE PAST 12 MONTHS, THE FOOD YOU BOUGHT JUST DIDN'T LAST AND YOU DIDN'T HAVE MONEY TO GET MORE.: NEVER TRUE

## 2025-04-27 SDOH — ECONOMIC STABILITY: INCOME INSECURITY: HOW HARD IS IT FOR YOU TO PAY FOR THE VERY BASICS LIKE FOOD, HOUSING, MEDICAL CARE, AND HEATING?: NOT HARD AT ALL

## 2025-04-27 ASSESSMENT — SOCIAL DETERMINANTS OF HEALTH (SDOH)
HOW OFTEN DO YOU ATTEND CHURCH OR RELIGIOUS SERVICES?: NEVER
HOW OFTEN DO YOU GET TOGETHER WITH FRIENDS OR RELATIVES?: TWICE A WEEK
IN A TYPICAL WEEK, HOW MANY TIMES DO YOU TALK ON THE PHONE WITH FAMILY, FRIENDS, OR NEIGHBORS?: NEVER
DO YOU BELONG TO ANY CLUBS OR ORGANIZATIONS SUCH AS CHURCH GROUPS UNIONS, FRATERNAL OR ATHLETIC GROUPS, OR SCHOOL GROUPS?: YES
HOW OFTEN DO YOU ATTEND CHURCH OR RELIGIOUS SERVICES?: NEVER
HOW OFTEN DO YOU ATTENT MEETINGS OF THE CLUB OR ORGANIZATION YOU BELONG TO?: MORE THAN 4 TIMES PER YEAR
HOW MANY DRINKS CONTAINING ALCOHOL DO YOU HAVE ON A TYPICAL DAY WHEN YOU ARE DRINKING: 1 OR 2
HOW OFTEN DO YOU HAVE A DRINK CONTAINING ALCOHOL: 2-4 TIMES A MONTH
HOW OFTEN DO YOU GET TOGETHER WITH FRIENDS OR RELATIVES?: TWICE A WEEK
HOW OFTEN DO YOU HAVE SIX OR MORE DRINKS ON ONE OCCASION: NEVER
IN A TYPICAL WEEK, HOW MANY TIMES DO YOU TALK ON THE PHONE WITH FAMILY, FRIENDS, OR NEIGHBORS?: NEVER
IN THE PAST 12 MONTHS, HAS THE ELECTRIC, GAS, OIL, OR WATER COMPANY THREATENED TO SHUT OFF SERVICE IN YOUR HOME?: NO
WITHIN THE PAST 12 MONTHS, YOU WORRIED THAT YOUR FOOD WOULD RUN OUT BEFORE YOU GOT THE MONEY TO BUY MORE: NEVER TRUE
HOW OFTEN DO YOU ATTENT MEETINGS OF THE CLUB OR ORGANIZATION YOU BELONG TO?: MORE THAN 4 TIMES PER YEAR
DO YOU BELONG TO ANY CLUBS OR ORGANIZATIONS SUCH AS CHURCH GROUPS UNIONS, FRATERNAL OR ATHLETIC GROUPS, OR SCHOOL GROUPS?: YES
HOW HARD IS IT FOR YOU TO PAY FOR THE VERY BASICS LIKE FOOD, HOUSING, MEDICAL CARE, AND HEATING?: NOT HARD AT ALL

## 2025-04-27 ASSESSMENT — LIFESTYLE VARIABLES
SKIP TO QUESTIONS 9-10: 1
AUDIT-C TOTAL SCORE: 2
HOW OFTEN DO YOU HAVE A DRINK CONTAINING ALCOHOL: 2-4 TIMES A MONTH
HOW OFTEN DO YOU HAVE SIX OR MORE DRINKS ON ONE OCCASION: NEVER
HOW MANY STANDARD DRINKS CONTAINING ALCOHOL DO YOU HAVE ON A TYPICAL DAY: 1 OR 2

## 2025-04-29 ENCOUNTER — OFFICE VISIT (OUTPATIENT)
Dept: MEDICAL GROUP | Facility: MEDICAL CENTER | Age: 75
End: 2025-04-29
Payer: MEDICARE

## 2025-04-29 VITALS
SYSTOLIC BLOOD PRESSURE: 130 MMHG | HEART RATE: 62 BPM | OXYGEN SATURATION: 96 % | DIASTOLIC BLOOD PRESSURE: 74 MMHG | WEIGHT: 200.62 LBS | TEMPERATURE: 97.2 F | BODY MASS INDEX: 29.71 KG/M2 | HEIGHT: 69 IN

## 2025-04-29 DIAGNOSIS — Z12.5 SCREENING FOR PROSTATE CANCER: ICD-10-CM

## 2025-04-29 DIAGNOSIS — Z00.00 MEDICARE ANNUAL WELLNESS VISIT, SUBSEQUENT: ICD-10-CM

## 2025-04-29 DIAGNOSIS — R94.4 DECREASED GFR: ICD-10-CM

## 2025-04-29 DIAGNOSIS — I10 ESSENTIAL HYPERTENSION: ICD-10-CM

## 2025-04-29 DIAGNOSIS — J30.2 SEASONAL ALLERGIES: ICD-10-CM

## 2025-04-29 DIAGNOSIS — E78.2 MIXED HYPERLIPIDEMIA: ICD-10-CM

## 2025-04-29 DIAGNOSIS — L71.9 ROSACEA, ACNE: ICD-10-CM

## 2025-04-29 PROBLEM — K63.5 POLYP OF COLON: Status: ACTIVE | Noted: 2024-10-30

## 2025-04-29 PROBLEM — Z71.2 ENCOUNTER TO DISCUSS TEST RESULTS: Status: RESOLVED | Noted: 2024-10-31 | Resolved: 2025-04-29

## 2025-04-29 RX ORDER — METRONIDAZOLE TOPICAL 7.5 MG/G
1 GEL TOPICAL DAILY
Qty: 45 G | Refills: 1 | Status: SHIPPED | OUTPATIENT
Start: 2025-04-29

## 2025-04-29 ASSESSMENT — PATIENT HEALTH QUESTIONNAIRE - PHQ9: CLINICAL INTERPRETATION OF PHQ2 SCORE: 0

## 2025-04-29 ASSESSMENT — ENCOUNTER SYMPTOMS: GENERAL WELL-BEING: GOOD

## 2025-04-29 ASSESSMENT — FIBROSIS 4 INDEX: FIB4 SCORE: 1.39

## 2025-04-29 ASSESSMENT — ACTIVITIES OF DAILY LIVING (ADL): BATHING_REQUIRES_ASSISTANCE: 0

## 2025-04-29 NOTE — PROGRESS NOTES
Chief Complaint   Patient presents with    Annual Exam       HPI:  Gosia Tejada is a 75 y.o. here for Medicare Annual Wellness Visit     Patient Active Problem List    Diagnosis Date Noted    Numbness of toes 10/31/2024    Internal hemorrhoids 10/31/2024    Encounter to discuss test results 10/31/2024    Decreased GFR 05/04/2023    WBC decreased 07/14/2020    OA (osteoarthritis) 07/30/2019    Essential hypertension 07/22/2019    Mixed hyperlipidemia 07/22/2019    Herpes 07/22/2019    Rosacea, acne 07/22/2019       Current Outpatient Medications   Medication Sig Dispense Refill    atorvastatin (LIPITOR) 10 MG Tab Take 1 Tablet by mouth every day. 90 Tablet 3    lisinopril (PRINIVIL) 10 MG Tab Take 1 Tablet by mouth every day. 90 Tablet 3    valacyclovir (VALTREX) 1 GM Tab Take 1 Tablet by mouth 2 times a day as needed (For cold sores). 180 Tablet 0    metronidazole (METROGEL) 0.75 % gel Apply 1 Application  topically every day. Indications: Rosacea 45 g 1    Glucos-Chondroit-Hyaluron-MSM (GLUCOSAMINE CHONDROITIN JOINT PO) Take 2 Tablets by mouth every day.      multivitamin (THERAGRAN) Tab Take 1 Tablet by mouth every day.      Cholecalciferol (D3 PO) Take 1 Capsule by mouth every day.      Coenzyme Q10 (CO Q 10 PO) Take 1 Capsule by mouth every day.       No current facility-administered medications for this visit.          Current supplements as per medication list.     Allergies: Nkda [no known drug allergy]    Current social contact/activities: , active, independent with ADLs     He  reports that he has never smoked. He has never used smokeless tobacco. He reports current alcohol use of about 1.2 oz of alcohol per week. He reports that he does not use drugs.  Counseling given: Not Answered      ROS:    Gait: Uses no assistive device  Ostomy: No  Other tubes: No  Amputations: No  Chronic oxygen use: No  Last eye exam: 2024  Wears hearing aids: No   : Denies any urinary leakage during the last 6  months    Screening:    Depression Screening  Little interest or pleasure in doing things?  0 - not at all  Feeling down, depressed , or hopeless? 0 - not at all  Patient Health Questionnaire Score: 0     If depressive symptoms identified deferred to follow up visit unless specifically addressed in assessment and plan.    Interpretation of PHQ-9 Total Score   Score Severity   1-4 No Depression   5-9 Mild Depression   10-14 Moderate Depression   15-19 Moderately Severe Depression   20-27 Severe Depression    Screening for Cognitive Impairment  Do you or any of your friends or family members have any concern about your memory? No  Three Minute Recall (Village, Kitchen, Baby) 3/3    Ashwin clock face with all 12 numbers and set the hands to show 10 minutes past 11.  Yes    Cognitive concerns identified deferred for follow up unless specifically addressed in assessment and plan.    Fall Risk Assessment  Has the patient had two or more falls in the last year or any fall with injury in the last year?  No    Safety Assessment  Do you always wear your seatbelt?  Yes  Any changes to home needed to function safely? No  Difficulty hearing.  No  Patient counseled about all safety risks that were identified.    Functional Assessment ADLs  Are there any barriers preventing you from cooking for yourself or meeting nutritional needs?  No.    Are there any barriers preventing you from driving safely or obtaining transportation?  No.    Are there any barriers preventing you from using a telephone or calling for help?  No    Are there any barriers preventing you from shopping?  No.    Are there any barriers preventing you from taking care of your own finances?  No    Are there any barriers preventing you from managing your medications?  No    Are there any barriers preventing you from showering, bathing or dressing yourself? No    Are there any barriers preventing you from doing housework or laundry? No  Are there any barriers  preventing you from using the toilet?No  Are you currently engaging in any exercise or physical activity?  No.      Self-Assessment of Health  What is your perception of your health? Good    Do you sleep more than six hours a night? Yes    In the past 7 days, how much did pain keep you from doing your normal work? None    Do you spend quality time with family or friends (virtually or in person)? Yes    Do you usually eat a heart healthy diet that constists of a variety of fruits, vegetables, whole grains and fiber? Yes    Do you eat foods high in fat and/or Fast Food more than three times per week? Yes Some weeks yes some weeks no   How concerned are you that your medical conditions are not being well managed? Not at all    Are you worried that in the next 2 months, you may not have stable housing that you own, rent, or stay in as part of a household? No      Advance Care Planning  Do you have an Advance Directive, Living Will, Durable Power of , or POLST?                   Health Maintenance Summary            Current Care Gaps       Annual Wellness Visit (Yearly) Overdue since 2023  Visit Dx: Medicare annual wellness visit, subsequent    2022  Visit Dx: Medicare annual wellness visit, subsequent    2022  Level of Service: SC PREVENTIVE VISIT,EST,65 & OVER    2021  Done    2021  Level of Service: SC PREVENTIVE VISIT,EST,65 & OVER     Only the first 5 history entries have been loaded, but more history exists.            COVID-19 Vaccine (4 - 2024-25 season) Overdue since 4/10/2025      10/10/2024  Imm Admin: COVID-19, mRNA, LNP-S, PF, channing-sucrose, 30 mcg/0.3 mL    2024  Imm Admin: COVID-19, mRNA, LNP-S, PF, channing-sucrose, 30 mcg/0.3 mL    10/23/2023  Imm Admin: Covid-19 Mrna (Spikevax) Moderna 12+ Years                      Needs Review       Hepatitis C Screening  Tentatively Complete      2020  Hepatitis C Antibody component of HCV Scrn (  1383-3047 1xLife)                      Upcoming       IMM DTaP/Tdap/Td Vaccine (2 - Td or Tdap) Next due on 6/23/2030 06/23/2020  Imm Admin: Tdap Vaccine                      Completed or No Longer Recommended       Influenza Vaccine (Series Information) Completed      10/10/2024  Outside Immunization: Influenza, High Dose    09/12/2023  Outside Immunization: Fluzone High-Dose Quad    10/08/2021  Imm Admin: Influenza Vaccine Adult HD    10/01/2020  Imm Admin: Influenza Vaccine Adult HD    09/30/2019  Imm Admin: Influenza Vaccine Adult HD      Only the first 5 history entries have been loaded, but more history exists.              Pneumococcal Vaccine: 50+ Years (Series Information) Completed      08/09/2016  Imm Admin: Pneumococcal Conjugate Vaccine (Prevnar/PCV-13)    06/12/2014  Imm Admin: Pneumococcal polysaccharide vaccine (PPSV-23)    05/12/2014  Imm Admin: Pneumococcal polysaccharide vaccine (PPSV-23)              Hepatitis A Vaccine (Hep A) (Series Information) Aged Out      No completion history exists for this topic.              Hepatitis B Vaccine (Hep B) (Series Information) Aged Out     No completion history exists for this topic.              HPV Vaccines (Series Information) Aged Out     No completion history exists for this topic.              Polio Vaccine (Inactivated Polio) (Series Information) Aged Out     No completion history exists for this topic.              Meningococcal Immunization (Series Information) Aged Out     No completion history exists for this topic.              Colorectal Cancer Screening  Discontinued        Frequency changed to Never automatically (Topic No Longer Applies)    11/01/2024  COLONOSCOPY RESULTS    10/30/2024  COLONOSCOPY RESULTS    07/07/2021  COLOGUARD RESULT component of COLOGUARD COLON CANCER SCREENING    07/07/2021  COLOGUARD COLON CANCER SCREENING     Only the first 5 history entries have been loaded, but more history exists.            Zoster  "(Shingles) Vaccines  Discontinued      06/28/2021  Imm Admin: Zoster Vaccine Recombinant (RZV) (SHINGRIX)                            Patient Care Team:  ZULEYMA Varela as PCP - General (Family Medicine)        Social History     Tobacco Use    Smoking status: Never    Smokeless tobacco: Never   Vaping Use    Vaping status: Never Used   Substance Use Topics    Alcohol use: Yes     Alcohol/week: 1.2 oz     Types: 2 Cans of beer per week     Comment: ! per week    Drug use: No     Family History   Adopted: Yes     He  has a past medical history of Arthritis, Dental disorder (07/2020), Heart burn, High cholesterol, Hyperlipemia, Hypertension, Rosacea, and Seasonal allergies.   Past Surgical History:   Procedure Laterality Date    EXTENSOR TENDON REPAIR Left 9/11/2020    Procedure: REPAIR, TENDON, EXTENSOR-THUMB POLLICIS LONGUS VERSUS;  Surgeon: Kathleen Rai M.D.;  Location: SURGERY SAME DAY [facility];  Service: Orthopedics    HIP ARTHROPLASTY TOTAL Right 2019    HIP ARTHROPLASTY TOTAL  11/20/2014    Left; Performed by Lucien Fierro M.D. at SURGERY [facility]    JOINT INJECTION DIAGNOSTIC  2/20/2014    Performed by Lucien Fierro M.D. at SURGERY [facility]    JOINT INJECTION DIAGNOSTIC  9/12/2013    Performed by Lucien Fierro M.D. at SURGERY [facility]    OTHER  1955    TONSILLECTOMY    OTHER      VASECTOMY    OTHER ORTHOPEDIC SURGERY      TONSILLECTOMY         Exam:   /74   Pulse 62   Temp 36.2 °C (97.2 °F) (Temporal)   Ht 1.753 m (5' 9\")   Wt 91 kg (200 lb 9.9 oz)   SpO2 96%  Body mass index is 29.63 kg/m².    Hearing excellent.    Dentition good  Alert, oriented in no acute distress.  Eye contact is good, speech goal directed, affect calm    Assessment and Plan. The following treatment and monitoring plan is recommended:    There are no diagnoses linked to this encounter.  Assessment & Plan  1. Hypertension  Chronic and stable condition.  - Blood pressure is well-managed " on the current regimen of lisinopril 10 mg daily  - No reported issues with blood pressure control  - Blood pressure monitoring shows stability  - Continue lisinopril 10 mg daily as prescribed    2. Hyperlipidemia  Chronic and stable condition.  - Currently taking atorvastatin for cholesterol management  - Last laboratory tests were conducted in 10/2024 and normal  - Will repeat labs in 6 months- Continue atorvastatin as prescribed    3. Rosacea  Chronic and stable condition.  Well-controlled with MetroGel, needing refills today  - Reports no issues with current management  - Physical exam findings consistent with stable rosacea  - Refill prescription for MetroGel to manage rosacea symptoms    4. Seasonal allergies  Chronic and stable condition.  - Reports significant improvement in seasonal allergies since using Navage  - No additional medications are needed at this time  - Seasonal allergies are well-controlled with current management  - Continue using Navage as needed for symptom relief    5.  Decreased GFR  Reviewed the labs from October 2024 GFR at 80    6. Health maintenance  7.  Medicare annual wellness visit, subsequent  - Had a colonoscopy in 2024, which revealed 2 slow-growing polyps  - No recall colonoscopy plan due to patient's age  - Discussed the option of a surveillance colonoscopy in 3 to 7 years or a Cologuard test every 3 years, with the next one due in 2026    Follow-up  - Follow-up appointment scheduled in 6 months     Services suggested: No services needed at this time  Health Care Screening: Age-appropriate preventive services recommended by USPTF and ACIP covered by Medicare were discussed today. Services ordered if indicated and agreed upon by the patient.  Referrals offered: Community-based lifestyle interventions to reduce health risks and promote self-management and wellness, fall prevention, nutrition, physical activity, tobacco-use cessation, weight loss, and mental health services as  per orders if indicated.    Discussion today about general wellness and lifestyle habits:    Prevent falls and reduce trip hazards; Cautioned about securing or removing rugs.  Have a working fire alarm and carbon monoxide detector;   Engage in regular physical activity and social activities     Follow-up: No follow-ups on file.

## (undated) DEVICE — CORDS BIPOLAR COAGULATION - 12FT STERILE DISP. (10EA/BX)

## (undated) DEVICE — SPLINT PLASTER 3 IN X 15 IN - (50/BX 12BX/CA)

## (undated) DEVICE — GLOVE BIOGEL SZ 8 SURGICAL PF LTX - (50PR/BX 4BX/CA)

## (undated) DEVICE — ELECTRODE 850 FOAM ADHESIVE - HYDROGEL RADIOTRNSPRNT (50/PK)

## (undated) DEVICE — LACTATED RINGERS INJ 1000 ML - (14EA/CA 60CA/PF)

## (undated) DEVICE — MASK ANESTHESIA ADULT  - (100/CA)

## (undated) DEVICE — MANIFOLD NEPTUNE 1 PORT (20/PK)

## (undated) DEVICE — ELECTRODE DUAL RETURN W/ CORD - (50/PK)

## (undated) DEVICE — CANISTER SUCTION RIGID RED 1500CC (40EA/CA)

## (undated) DEVICE — SUCTION INSTRUMENT YANKAUER BULBOUS TIP W/O VENT (50EA/CA)

## (undated) DEVICE — SET EXTENSION WITH 2 PORTS (48EA/CA) ***PART #2C8610 IS A SUBSTITUTE*****

## (undated) DEVICE — SODIUM CHL IRRIGATION 0.9% 1000ML (12EA/CA)

## (undated) DEVICE — SET LEADWIRE 5 LEAD BEDSIDE DISPOSABLE ECG (1SET OF 5/EA)

## (undated) DEVICE — TUBE CONNECTING SUCTION - CLEAR PLASTIC STERILE 72 IN (50EA/CA)

## (undated) DEVICE — HEAD HOLDER JUNIOR/ADULT

## (undated) DEVICE — MASK, LARYNGEAL AIRWAY #4

## (undated) DEVICE — SUTURE GENERAL

## (undated) DEVICE — GLOVE BIOGEL PI INDICATOR SZ 7.5 SURGICAL PF LF -(50/BX 4BX/CA)

## (undated) DEVICE — TOURNIQUET, STERILE 18 (RED)

## (undated) DEVICE — CATHETER IV 20 GA X 1-1/4 ---SURG.& SDS ONLY--- (50EA/BX)

## (undated) DEVICE — KIT ANESTHESIA W/CIRCUIT & 3/LT BAG W/FILTER (20EA/CA)

## (undated) DEVICE — WATER IRRIGATION STERILE 1000ML (12EA/CA)

## (undated) DEVICE — GLOVE BIOGEL SZ 6.5 SURGICAL PF LTX (50PR/BX 4BX/CA)

## (undated) DEVICE — BANDAGE ELASTIC 4 IN X 5 YDS - LATEX FREE(10/BX 5BX/CA)

## (undated) DEVICE — PACK LOWER EXTREMITY - (2/CA)

## (undated) DEVICE — SUTURE 4-0 ETHILON FS-2 18 (36PK/BX)"

## (undated) DEVICE — PADDING CAST 4 IN STERILE - 4 X 4 YDS (24/CA)

## (undated) DEVICE — SENSOR SPO2 NEO LNCS ADHESIVE (20/BX) SEE USER NOTES

## (undated) DEVICE — GLOVE SZ 7 BIOGEL PI MICRO - PF LF (50PR/BX 4BX/CA)

## (undated) DEVICE — KIT  I.V. START (100EA/CA)

## (undated) DEVICE — TUBING CLEARLINK DUO-VENT - C-FLO (48EA/CA)

## (undated) DEVICE — PROTECTOR ULNA NERVE - (36PR/CA)

## (undated) DEVICE — GLOVE BIOGEL INDICATOR SZ 6.5 SURGICAL PF LTX - (50PR/BX 4BX/CA)

## (undated) DEVICE — GOWN WARMING STANDARD FLEX - (30/CA)

## (undated) DEVICE — GLOVE BIOGEL PI ORTHO SZ 7.5 PF LF (40PR/BX)

## (undated) DEVICE — GLOVE BIOGEL PI INDICATOR SZ 8.0 SURGICAL PF LF -(50/BX 4BX/CA)

## (undated) DEVICE — CANISTER SUCTION 3000ML MECHANICAL FILTER AUTO SHUTOFF MEDI-VAC NONSTERILE LF DISP  (40EA/CA)